# Patient Record
Sex: FEMALE | Race: WHITE | NOT HISPANIC OR LATINO | Employment: OTHER | URBAN - METROPOLITAN AREA
[De-identification: names, ages, dates, MRNs, and addresses within clinical notes are randomized per-mention and may not be internally consistent; named-entity substitution may affect disease eponyms.]

---

## 2017-02-04 ENCOUNTER — APPOINTMENT (EMERGENCY)
Dept: CT IMAGING | Facility: HOSPITAL | Age: 82
End: 2017-02-04
Payer: COMMERCIAL

## 2017-02-04 ENCOUNTER — HOSPITAL ENCOUNTER (EMERGENCY)
Facility: HOSPITAL | Age: 82
Discharge: HOME | End: 2017-02-04
Attending: EMERGENCY MEDICINE
Payer: COMMERCIAL

## 2017-02-04 ENCOUNTER — APPOINTMENT (EMERGENCY)
Dept: RADIOLOGY | Facility: HOSPITAL | Age: 82
End: 2017-02-04
Payer: COMMERCIAL

## 2017-02-04 VITALS
OXYGEN SATURATION: 98 % | WEIGHT: 145 LBS | SYSTOLIC BLOOD PRESSURE: 140 MMHG | DIASTOLIC BLOOD PRESSURE: 70 MMHG | RESPIRATION RATE: 16 BRPM | HEART RATE: 70 BPM | TEMPERATURE: 97.8 F

## 2017-02-04 DIAGNOSIS — S00.31XA NASAL ABRASION, INITIAL ENCOUNTER: ICD-10-CM

## 2017-02-04 DIAGNOSIS — S80.02XA CONTUSION OF LEFT KNEE, INITIAL ENCOUNTER: ICD-10-CM

## 2017-02-04 DIAGNOSIS — S80.212A ABRASION, LEFT KNEE, INITIAL ENCOUNTER: ICD-10-CM
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
## 2017-02-04 DIAGNOSIS — S52.531A CLOSED COLLES' FRACTURE OF RIGHT RADIUS, INITIAL ENCOUNTER: ICD-10-CM

## 2017-02-04 DIAGNOSIS — S00.83XA FACIAL CONTUSION, INITIAL ENCOUNTER: ICD-10-CM

## 2017-02-04 LAB
ANION GAP BLD CALC-SCNC: 19 MMOL/L (ref 4–13)
BASOPHILS # BLD AUTO: 0.06 THOUSANDS/ΜL (ref 0–0.1)
BASOPHILS NFR BLD AUTO: 1 % (ref 0–1)
BUN BLD-MCNC: 14 MG/DL (ref 5–25)
CA-I BLD-SCNC: 1.1 MMOL/L (ref 1.12–1.32)
CHLORIDE BLD-SCNC: 101 MMOL/L (ref 100–108)
CREAT BLD-MCNC: 0.7 MG/DL (ref 0.6–1.3)
EOSINOPHIL # BLD AUTO: 0.17 THOUSAND/ΜL (ref 0–0.61)
EOSINOPHIL NFR BLD AUTO: 2 % (ref 0–6)
ERYTHROCYTE [DISTWIDTH] IN BLOOD BY AUTOMATED COUNT: 14 % (ref 11.6–15.1)
GFR SERPL CREATININE-BSD FRML MDRD: >60 ML/MIN/1.73SQ M
GLUCOSE SERPL-MCNC: 256 MG/DL (ref 65–140)
HCT VFR BLD AUTO: 41.7 % (ref 34.8–46.1)
HCT VFR BLD CALC: 41 % (ref 34.8–46.1)
HGB BLD-MCNC: 13.7 G/DL (ref 11.5–15.4)
HGB BLDA-MCNC: 13.9 G/DL (ref 11.5–15.4)
INR PPP: 1 (ref 0.86–1.16)
LYMPHOCYTES # BLD AUTO: 1.31 THOUSANDS/ΜL (ref 0.6–4.47)
LYMPHOCYTES NFR BLD AUTO: 16 % (ref 14–44)
MCH RBC QN AUTO: 29.1 PG (ref 26.8–34.3)
MCHC RBC AUTO-ENTMCNC: 32.9 G/DL (ref 31.4–37.4)
MCV RBC AUTO: 89 FL (ref 82–98)
MONOCYTES # BLD AUTO: 0.88 THOUSAND/ΜL (ref 0.17–1.22)
MONOCYTES NFR BLD AUTO: 11 % (ref 4–12)
NEUTROPHILS # BLD AUTO: 5.78 THOUSANDS/ΜL (ref 1.85–7.62)
NEUTS SEG NFR BLD AUTO: 70 % (ref 43–75)
PCO2 BLD: 24 MMOL/L (ref 21–32)
PLATELET # BLD AUTO: 302 THOUSANDS/UL (ref 149–390)
PMV BLD AUTO: 9.9 FL (ref 8.9–12.7)
POTASSIUM BLD-SCNC: 4.1 MMOL/L (ref 3.5–5.3)
PROTHROMBIN TIME: 13.1 SECONDS (ref 12–14.3)
RBC # BLD AUTO: 4.7 MILLION/UL (ref 3.81–5.12)
SODIUM BLD-SCNC: 139 MMOL/L (ref 136–145)
SPECIMEN SOURCE: ABNORMAL
WBC # BLD AUTO: 8.2 THOUSAND/UL (ref 4.31–10.16)

## 2017-02-04 PROCEDURE — 90471 IMMUNIZATION ADMIN: CPT

## 2017-02-04 PROCEDURE — 73564 X-RAY EXAM KNEE 4 OR MORE: CPT

## 2017-02-04 PROCEDURE — 85610 PROTHROMBIN TIME: CPT | Performed by: EMERGENCY MEDICINE

## 2017-02-04 PROCEDURE — 93005 ELECTROCARDIOGRAM TRACING: CPT | Performed by: EMERGENCY MEDICINE

## 2017-02-04 PROCEDURE — 90715 TDAP VACCINE 7 YRS/> IM: CPT | Performed by: EMERGENCY MEDICINE

## 2017-02-04 PROCEDURE — 80047 BASIC METABLC PNL IONIZED CA: CPT

## 2017-02-04 PROCEDURE — 85025 COMPLETE CBC W/AUTO DIFF WBC: CPT | Performed by: EMERGENCY MEDICINE

## 2017-02-04 PROCEDURE — 99285 EMERGENCY DEPT VISIT HI MDM: CPT

## 2017-02-04 PROCEDURE — 70450 CT HEAD/BRAIN W/O DYE: CPT

## 2017-02-04 PROCEDURE — 71260 CT THORAX DX C+: CPT

## 2017-02-04 PROCEDURE — 73110 X-RAY EXAM OF WRIST: CPT

## 2017-02-04 PROCEDURE — 74177 CT ABD & PELVIS W/CONTRAST: CPT

## 2017-02-04 PROCEDURE — 72125 CT NECK SPINE W/O DYE: CPT

## 2017-02-04 PROCEDURE — 36415 COLL VENOUS BLD VENIPUNCTURE: CPT | Performed by: EMERGENCY MEDICINE

## 2017-02-04 PROCEDURE — 96374 THER/PROPH/DIAG INJ IV PUSH: CPT

## 2017-02-04 RX ORDER — 0.9 % SODIUM CHLORIDE 0.9 %
3 VIAL (ML) INJECTION AS NEEDED
Status: DISCONTINUED | OUTPATIENT
Start: 2017-02-04 | End: 2017-02-05 | Stop reason: HOSPADM

## 2017-02-04 RX ORDER — SULFAMETHOXAZOLE AND TRIMETHOPRIM 400; 80 MG/1; MG/1
1 TABLET ORAL 2 TIMES DAILY
COMMUNITY
End: 2017-02-04 | Stop reason: CLARIF

## 2017-02-04 RX ORDER — PANTOPRAZOLE SODIUM 40 MG/1
40 TABLET, DELAYED RELEASE ORAL DAILY
COMMUNITY

## 2017-02-04 RX ORDER — HYDROCODONE BITARTRATE AND ACETAMINOPHEN 5; 325 MG/1; MG/1
0.5 TABLET ORAL EVERY 6 HOURS PRN
Qty: 6 TABLET | Refills: 0 | Status: SHIPPED | OUTPATIENT
Start: 2017-02-04 | End: 2017-02-07

## 2017-02-04 RX ORDER — KETOROLAC TROMETHAMINE 30 MG/ML
INJECTION, SOLUTION INTRAMUSCULAR; INTRAVENOUS
Status: COMPLETED
Start: 2017-02-04 | End: 2017-02-04

## 2017-02-04 RX ORDER — KETOROLAC TROMETHAMINE 30 MG/ML
15 INJECTION, SOLUTION INTRAMUSCULAR; INTRAVENOUS ONCE
Status: COMPLETED | OUTPATIENT
Start: 2017-02-04 | End: 2017-02-04

## 2017-02-04 RX ORDER — SULFAMETHOXAZOLE AND TRIMETHOPRIM 800; 160 MG/1; MG/1
1 TABLET ORAL 2 TIMES DAILY
COMMUNITY
End: 2017-05-31 | Stop reason: ALTCHOICE

## 2017-02-04 RX ADMIN — IOHEXOL 100 ML: 350 INJECTION, SOLUTION INTRAVENOUS at 21:04

## 2017-02-04 RX ADMIN — TETANUS TOXOID, REDUCED DIPHTHERIA TOXOID AND ACELLULAR PERTUSSIS VACCINE, ADSORBED 0.5 ML: 5; 2.5; 8; 8; 2.5 SUSPENSION INTRAMUSCULAR at 21:17

## 2017-02-04 RX ADMIN — KETOROLAC TROMETHAMINE 15 MG: 30 INJECTION, SOLUTION INTRAMUSCULAR at 21:43

## 2017-02-04 RX ADMIN — KETOROLAC TROMETHAMINE 15 MG: 30 INJECTION, SOLUTION INTRAMUSCULAR; INTRAVENOUS at 21:43

## 2017-02-06 ENCOUNTER — HOSPITAL ENCOUNTER (OUTPATIENT)
Dept: RADIOLOGY | Facility: CLINIC | Age: 82
Discharge: HOME | End: 2017-02-06
Payer: COMMERCIAL

## 2017-02-06 ENCOUNTER — ALLSCRIPTS OFFICE VISIT (OUTPATIENT)
Dept: OTHER | Facility: OTHER | Age: 82
End: 2017-02-06

## 2017-02-06 DIAGNOSIS — S52.531D CLOSED COLLES' FRACTURE OF RIGHT RADIUS WITH ROUTINE HEALING: ICD-10-CM

## 2017-02-06 DIAGNOSIS — S62.109A CLOSED FRACTURE OF CARPAL BONE: ICD-10-CM

## 2017-02-06 DIAGNOSIS — S52.539A CLOSED COLLES' FRACTURE: ICD-10-CM

## 2017-02-06 LAB
ATRIAL RATE: 77 BPM
P AXIS: 44 DEGREES
PR INTERVAL: 182 MS
QRS AXIS: 80 DEGREES
QRSD INTERVAL: 76 MS
QT INTERVAL: 420 MS
QTC INTERVAL: 475 MS
T WAVE AXIS: 87 DEGREES
VENTRICULAR RATE: 77 BPM

## 2017-02-06 PROCEDURE — 73110 X-RAY EXAM OF WRIST: CPT

## 2017-02-13 ENCOUNTER — HOSPITAL ENCOUNTER (OUTPATIENT)
Dept: RADIOLOGY | Facility: CLINIC | Age: 82
Discharge: HOME | End: 2017-02-13
Payer: COMMERCIAL

## 2017-02-13 ENCOUNTER — ALLSCRIPTS OFFICE VISIT (OUTPATIENT)
Dept: OTHER | Facility: OTHER | Age: 82
End: 2017-02-13

## 2017-02-13 DIAGNOSIS — S52.539A CLOSED COLLES' FRACTURE: ICD-10-CM

## 2017-02-13 PROCEDURE — 73110 X-RAY EXAM OF WRIST: CPT

## 2017-02-27 ENCOUNTER — ALLSCRIPTS OFFICE VISIT (OUTPATIENT)
Dept: OTHER | Facility: OTHER | Age: 82
End: 2017-02-27

## 2017-02-27 ENCOUNTER — HOSPITAL ENCOUNTER (OUTPATIENT)
Dept: RADIOLOGY | Facility: CLINIC | Age: 82
Discharge: HOME | End: 2017-02-27
Payer: COMMERCIAL

## 2017-02-27 DIAGNOSIS — S52.531D CLOSED COLLES' FRACTURE OF RIGHT RADIUS WITH ROUTINE HEALING: ICD-10-CM

## 2017-02-27 PROCEDURE — 73110 X-RAY EXAM OF WRIST: CPT

## 2017-03-06 ENCOUNTER — HOSPITAL ENCOUNTER (OUTPATIENT)
Dept: RADIOLOGY | Facility: CLINIC | Age: 82
Discharge: HOME | End: 2017-03-06
Payer: COMMERCIAL

## 2017-03-06 ENCOUNTER — ALLSCRIPTS OFFICE VISIT (OUTPATIENT)
Dept: OTHER | Facility: OTHER | Age: 82
End: 2017-03-06

## 2017-03-06 DIAGNOSIS — S52.531D CLOSED COLLES' FRACTURE OF RIGHT RADIUS WITH ROUTINE HEALING: ICD-10-CM

## 2017-03-06 PROCEDURE — 73110 X-RAY EXAM OF WRIST: CPT

## 2017-03-24 DIAGNOSIS — S52.531D CLOSED COLLES' FRACTURE OF RIGHT RADIUS WITH ROUTINE HEALING: ICD-10-CM

## 2017-03-27 ENCOUNTER — HOSPITAL ENCOUNTER (OUTPATIENT)
Dept: RADIOLOGY | Facility: CLINIC | Age: 82
Discharge: HOME | End: 2017-03-27
Payer: COMMERCIAL

## 2017-03-27 ENCOUNTER — ALLSCRIPTS OFFICE VISIT (OUTPATIENT)
Dept: OTHER | Facility: OTHER | Age: 82
End: 2017-03-27

## 2017-03-27 DIAGNOSIS — S52.531D CLOSED COLLES' FRACTURE OF RIGHT RADIUS WITH ROUTINE HEALING: ICD-10-CM

## 2017-03-27 PROCEDURE — 73110 X-RAY EXAM OF WRIST: CPT

## 2017-05-31 ENCOUNTER — APPOINTMENT (EMERGENCY)
Dept: RADIOLOGY | Facility: HOSPITAL | Age: 82
End: 2017-05-31
Payer: COMMERCIAL

## 2017-05-31 ENCOUNTER — HOSPITAL ENCOUNTER (EMERGENCY)
Facility: HOSPITAL | Age: 82
Discharge: HOME | End: 2017-05-31
Attending: EMERGENCY MEDICINE
Payer: COMMERCIAL

## 2017-05-31 VITALS
SYSTOLIC BLOOD PRESSURE: 143 MMHG | DIASTOLIC BLOOD PRESSURE: 61 MMHG | RESPIRATION RATE: 20 BRPM | WEIGHT: 136.02 LBS | HEART RATE: 74 BPM | OXYGEN SATURATION: 100 % | TEMPERATURE: 98.2 F

## 2017-05-31 DIAGNOSIS — S62.102A: Primary | ICD-10-CM

## 2017-05-31 PROCEDURE — 73110 X-RAY EXAM OF WRIST: CPT

## 2017-05-31 PROCEDURE — 99283 EMERGENCY DEPT VISIT LOW MDM: CPT

## 2017-06-02 ENCOUNTER — ALLSCRIPTS OFFICE VISIT (OUTPATIENT)
Dept: OTHER | Facility: OTHER | Age: 82
End: 2017-06-02

## 2017-06-05 ENCOUNTER — APPOINTMENT (EMERGENCY)
Dept: RADIOLOGY | Facility: HOSPITAL | Age: 82
End: 2017-06-05
Payer: COMMERCIAL

## 2017-06-05 ENCOUNTER — HOSPITAL ENCOUNTER (EMERGENCY)
Facility: HOSPITAL | Age: 82
Discharge: NURSING, REHAB OR LONG-TERM CARE | End: 2017-06-06
Attending: EMERGENCY MEDICINE | Admitting: EMERGENCY MEDICINE
Payer: COMMERCIAL

## 2017-06-05 DIAGNOSIS — Z87.81 HISTORY OF FRACTURED KNEECAP: ICD-10-CM

## 2017-06-05 DIAGNOSIS — S52.022A CLOSED OLECRANON FRACTURE, LEFT, INITIAL ENCOUNTER: Primary | ICD-10-CM

## 2017-06-05 PROCEDURE — 73080 X-RAY EXAM OF ELBOW: CPT

## 2017-06-05 PROCEDURE — 73564 X-RAY EXAM KNEE 4 OR MORE: CPT

## 2017-06-05 PROCEDURE — 93005 ELECTROCARDIOGRAM TRACING: CPT

## 2017-06-05 RX ORDER — CITALOPRAM 20 MG/1
10 TABLET ORAL DAILY
COMMUNITY

## 2017-06-05 RX ORDER — TRAMADOL HYDROCHLORIDE 50 MG/1
50 TABLET ORAL EVERY 6 HOURS PRN
Qty: 30 TABLET | Refills: 0 | Status: SHIPPED | OUTPATIENT
Start: 2017-06-05 | End: 2017-06-15

## 2017-06-05 RX ORDER — ACETAMINOPHEN 325 MG/1
650 TABLET ORAL ONCE
Status: COMPLETED | OUTPATIENT
Start: 2017-06-05 | End: 2017-06-05

## 2017-06-05 RX ORDER — FLUDROCORTISONE ACETATE 0.1 MG/1
0.1 TABLET ORAL DAILY
Status: ON HOLD | COMMUNITY
End: 2019-08-22

## 2017-06-05 RX ADMIN — ACETAMINOPHEN 650 MG: 325 TABLET ORAL at 19:40

## 2017-06-06 VITALS
TEMPERATURE: 97.8 F | WEIGHT: 135 LBS | RESPIRATION RATE: 18 BRPM | DIASTOLIC BLOOD PRESSURE: 67 MMHG | HEIGHT: 61 IN | SYSTOLIC BLOOD PRESSURE: 164 MMHG | BODY MASS INDEX: 25.49 KG/M2 | OXYGEN SATURATION: 96 % | HEART RATE: 62 BPM

## 2017-06-06 LAB
ATRIAL RATE: 69 BPM
P AXIS: 13 DEGREES
PR INTERVAL: 166 MS
QRS AXIS: 68 DEGREES
QRSD INTERVAL: 72 MS
QT INTERVAL: 416 MS
QTC INTERVAL: 445 MS
T WAVE AXIS: 69 DEGREES
VENTRICULAR RATE: 69 BPM

## 2017-06-06 PROCEDURE — 99284 EMERGENCY DEPT VISIT MOD MDM: CPT

## 2017-06-08 ENCOUNTER — GENERIC CONVERSION - ENCOUNTER (OUTPATIENT)
Dept: OTHER | Facility: OTHER | Age: 82
End: 2017-06-08

## 2017-07-13 ENCOUNTER — TRANSCRIBE ORDERS (OUTPATIENT)
Dept: ADMINISTRATIVE | Facility: HOSPITAL | Age: 82
End: 2017-07-13

## 2017-07-13 DIAGNOSIS — R26.0 STAGGERING GAIT: Primary | ICD-10-CM

## 2017-07-19 DIAGNOSIS — S52.539A CLOSED COLLES' FRACTURE: ICD-10-CM

## 2017-07-19 DIAGNOSIS — S52.022A: ICD-10-CM

## 2017-07-19 DIAGNOSIS — S52.531D CLOSED COLLES' FRACTURE OF RIGHT RADIUS WITH ROUTINE HEALING: ICD-10-CM

## 2017-07-20 ENCOUNTER — ALLSCRIPTS OFFICE VISIT (OUTPATIENT)
Dept: OTHER | Facility: OTHER | Age: 82
End: 2017-07-20

## 2017-07-20 ENCOUNTER — APPOINTMENT (OUTPATIENT)
Dept: RADIOLOGY | Facility: CLINIC | Age: 82
End: 2017-07-20
Payer: COMMERCIAL

## 2017-07-20 ENCOUNTER — APPOINTMENT (OUTPATIENT)
Dept: OCCUPATIONAL THERAPY | Facility: CLINIC | Age: 82
End: 2017-07-20
Payer: COMMERCIAL

## 2017-07-20 DIAGNOSIS — S52.539A CLOSED COLLES' FRACTURE: ICD-10-CM

## 2017-07-20 DIAGNOSIS — S52.022A: ICD-10-CM

## 2017-07-20 DIAGNOSIS — S52.531D CLOSED COLLES' FRACTURE OF RIGHT RADIUS WITH ROUTINE HEALING: ICD-10-CM

## 2017-07-20 PROCEDURE — 73110 X-RAY EXAM OF WRIST: CPT

## 2017-07-20 PROCEDURE — 73080 X-RAY EXAM OF ELBOW: CPT

## 2017-07-20 PROCEDURE — 97760 ORTHOTIC MGMT&TRAING 1ST ENC: CPT

## 2017-07-21 ENCOUNTER — HOSPITAL ENCOUNTER (OUTPATIENT)
Dept: MRI IMAGING | Facility: HOSPITAL | Age: 82
Discharge: HOME | End: 2017-07-21
Payer: COMMERCIAL

## 2017-07-21 DIAGNOSIS — R26.0 STAGGERING GAIT: ICD-10-CM

## 2017-07-21 PROCEDURE — 70553 MRI BRAIN STEM W/O & W/DYE: CPT

## 2017-07-21 PROCEDURE — A9585 GADOBUTROL INJECTION: HCPCS | Performed by: RADIOLOGY

## 2017-07-21 RX ADMIN — GADOBUTROL 5 ML: 604.72 INJECTION INTRAVENOUS at 12:18

## 2017-08-31 ENCOUNTER — ALLSCRIPTS OFFICE VISIT (OUTPATIENT)
Dept: OTHER | Facility: OTHER | Age: 82
End: 2017-08-31

## 2018-01-13 VITALS
HEIGHT: 56 IN | WEIGHT: 120 LBS | SYSTOLIC BLOOD PRESSURE: 111 MMHG | DIASTOLIC BLOOD PRESSURE: 72 MMHG | BODY MASS INDEX: 26.99 KG/M2 | HEART RATE: 74 BPM

## 2018-01-13 VITALS
WEIGHT: 120 LBS | HEIGHT: 56 IN | DIASTOLIC BLOOD PRESSURE: 72 MMHG | HEART RATE: 88 BPM | BODY MASS INDEX: 26.99 KG/M2 | SYSTOLIC BLOOD PRESSURE: 124 MMHG

## 2018-01-13 VITALS — HEART RATE: 68 BPM | SYSTOLIC BLOOD PRESSURE: 120 MMHG | DIASTOLIC BLOOD PRESSURE: 72 MMHG

## 2018-01-13 VITALS — DIASTOLIC BLOOD PRESSURE: 85 MMHG | SYSTOLIC BLOOD PRESSURE: 134 MMHG | HEART RATE: 75 BPM

## 2018-01-13 VITALS — SYSTOLIC BLOOD PRESSURE: 147 MMHG | HEART RATE: 79 BPM | DIASTOLIC BLOOD PRESSURE: 89 MMHG

## 2018-01-14 VITALS
BODY MASS INDEX: 26.99 KG/M2 | SYSTOLIC BLOOD PRESSURE: 118 MMHG | HEIGHT: 56 IN | HEART RATE: 72 BPM | DIASTOLIC BLOOD PRESSURE: 70 MMHG | WEIGHT: 120 LBS

## 2018-01-15 VITALS — WEIGHT: 124 LBS | DIASTOLIC BLOOD PRESSURE: 66 MMHG | HEART RATE: 94 BPM | SYSTOLIC BLOOD PRESSURE: 128 MMHG

## 2018-01-22 VITALS
HEIGHT: 56 IN | DIASTOLIC BLOOD PRESSURE: 69 MMHG | BODY MASS INDEX: 26.99 KG/M2 | HEART RATE: 80 BPM | WEIGHT: 120 LBS | SYSTOLIC BLOOD PRESSURE: 113 MMHG

## 2019-08-22 ENCOUNTER — HOSPITAL ENCOUNTER (INPATIENT)
Facility: HOSPITAL | Age: 84
LOS: 2 days | Discharge: HOME HEALTH | DRG: 641 | End: 2019-08-24
Attending: EMERGENCY MEDICINE | Admitting: INTERNAL MEDICINE
Payer: COMMERCIAL

## 2019-08-22 ENCOUNTER — APPOINTMENT (EMERGENCY)
Dept: RADIOLOGY | Facility: HOSPITAL | Age: 84
DRG: 641 | End: 2019-08-22
Payer: COMMERCIAL

## 2019-08-22 DIAGNOSIS — R55 SYNCOPE: Primary | ICD-10-CM

## 2019-08-22 DIAGNOSIS — N39.0 URINARY TRACT INFECTION WITHOUT HEMATURIA, SITE UNSPECIFIED: ICD-10-CM

## 2019-08-22 DIAGNOSIS — G20 PARKINSON DISEASE (HCC): ICD-10-CM

## 2019-08-22 PROBLEM — K21.9 GERD (GASTROESOPHAGEAL REFLUX DISEASE): Chronic | Status: ACTIVE | Noted: 2019-08-22

## 2019-08-22 LAB
ALBUMIN SERPL BCP-MCNC: 3.3 G/DL (ref 3.5–5)
ALP SERPL-CCNC: 49 U/L (ref 46–116)
ALT SERPL W P-5'-P-CCNC: 9 U/L (ref 12–78)
ANION GAP SERPL CALCULATED.3IONS-SCNC: 8 MMOL/L (ref 4–13)
AST SERPL W P-5'-P-CCNC: 27 U/L (ref 5–45)
ATRIAL RATE: 67 BPM
BACTERIA UR QL AUTO: ABNORMAL /HPF
BASOPHILS # BLD AUTO: 0.08 THOUSANDS/ΜL (ref 0–0.1)
BASOPHILS NFR BLD AUTO: 1 % (ref 0–1)
BILIRUB SERPL-MCNC: 0.4 MG/DL (ref 0.2–1)
BILIRUB UR QL STRIP: NEGATIVE
BUN SERPL-MCNC: 19 MG/DL (ref 5–25)
CALCIUM SERPL-MCNC: 9.2 MG/DL (ref 8.3–10.1)
CHLORIDE SERPL-SCNC: 106 MMOL/L (ref 100–108)
CLARITY UR: ABNORMAL
CO2 SERPL-SCNC: 29 MMOL/L (ref 21–32)
COLOR UR: YELLOW
CREAT SERPL-MCNC: 0.73 MG/DL (ref 0.6–1.3)
EOSINOPHIL # BLD AUTO: 0.25 THOUSAND/ΜL (ref 0–0.61)
EOSINOPHIL NFR BLD AUTO: 3 % (ref 0–6)
ERYTHROCYTE [DISTWIDTH] IN BLOOD BY AUTOMATED COUNT: 13.3 % (ref 11.6–15.1)
GFR SERPL CREATININE-BSD FRML MDRD: 71 ML/MIN/1.73SQ M
GLUCOSE SERPL-MCNC: 169 MG/DL (ref 65–140)
GLUCOSE UR STRIP-MCNC: NEGATIVE MG/DL
HCT VFR BLD AUTO: 47 % (ref 34.8–46.1)
HGB BLD-MCNC: 14.6 G/DL (ref 11.5–15.4)
HGB UR QL STRIP.AUTO: NEGATIVE
IMM GRANULOCYTES # BLD AUTO: 0.08 THOUSAND/UL (ref 0–0.2)
IMM GRANULOCYTES NFR BLD AUTO: 1 % (ref 0–2)
KETONES UR STRIP-MCNC: NEGATIVE MG/DL
LEUKOCYTE ESTERASE UR QL STRIP: ABNORMAL
LYMPHOCYTES # BLD AUTO: 1.51 THOUSANDS/ΜL (ref 0.6–4.47)
LYMPHOCYTES NFR BLD AUTO: 16 % (ref 14–44)
MCH RBC QN AUTO: 29.3 PG (ref 26.8–34.3)
MCHC RBC AUTO-ENTMCNC: 31.1 G/DL (ref 31.4–37.4)
MCV RBC AUTO: 94 FL (ref 82–98)
MONOCYTES # BLD AUTO: 0.79 THOUSAND/ΜL (ref 0.17–1.22)
MONOCYTES NFR BLD AUTO: 8 % (ref 4–12)
NEUTROPHILS # BLD AUTO: 6.78 THOUSANDS/ΜL (ref 1.85–7.62)
NEUTS SEG NFR BLD AUTO: 71 % (ref 43–75)
NITRITE UR QL STRIP: POSITIVE
NON-SQ EPI CELLS URNS QL MICRO: ABNORMAL /HPF
NRBC BLD AUTO-RTO: 0 /100 WBCS
P AXIS: 35 DEGREES
PH UR STRIP.AUTO: 8 [PH]
PLATELET # BLD AUTO: 303 THOUSANDS/UL (ref 149–390)
PMV BLD AUTO: 9.8 FL (ref 8.9–12.7)
POTASSIUM SERPL-SCNC: 4.5 MMOL/L (ref 3.5–5.3)
PR INTERVAL: 180 MS
PROT SERPL-MCNC: 6.8 G/DL (ref 6.4–8.2)
PROT UR STRIP-MCNC: NEGATIVE MG/DL
QRS AXIS: 40 DEGREES
QRSD INTERVAL: 76 MS
QT INTERVAL: 426 MS
QTC INTERVAL: 450 MS
RBC # BLD AUTO: 4.99 MILLION/UL (ref 3.81–5.12)
RBC #/AREA URNS AUTO: ABNORMAL /HPF
SODIUM SERPL-SCNC: 143 MMOL/L (ref 136–145)
SP GR UR STRIP.AUTO: 1.01 (ref 1–1.03)
T WAVE AXIS: 57 DEGREES
TROPONIN I SERPL-MCNC: <0.02 NG/ML
UROBILINOGEN UR QL STRIP.AUTO: 0.2 E.U./DL
VENTRICULAR RATE: 67 BPM
WBC # BLD AUTO: 9.49 THOUSAND/UL (ref 4.31–10.16)
WBC #/AREA URNS AUTO: ABNORMAL /HPF

## 2019-08-22 PROCEDURE — 93010 ELECTROCARDIOGRAM REPORT: CPT | Performed by: INTERNAL MEDICINE

## 2019-08-22 PROCEDURE — 81001 URINALYSIS AUTO W/SCOPE: CPT | Performed by: EMERGENCY MEDICINE

## 2019-08-22 PROCEDURE — 71045 X-RAY EXAM CHEST 1 VIEW: CPT

## 2019-08-22 PROCEDURE — 87081 CULTURE SCREEN ONLY: CPT | Performed by: NURSE PRACTITIONER

## 2019-08-22 PROCEDURE — 87186 SC STD MICRODIL/AGAR DIL: CPT | Performed by: INTERNAL MEDICINE

## 2019-08-22 PROCEDURE — 99284 EMERGENCY DEPT VISIT MOD MDM: CPT | Performed by: EMERGENCY MEDICINE

## 2019-08-22 PROCEDURE — 36415 COLL VENOUS BLD VENIPUNCTURE: CPT | Performed by: EMERGENCY MEDICINE

## 2019-08-22 PROCEDURE — 85025 COMPLETE CBC W/AUTO DIFF WBC: CPT | Performed by: EMERGENCY MEDICINE

## 2019-08-22 PROCEDURE — 80053 COMPREHEN METABOLIC PANEL: CPT | Performed by: EMERGENCY MEDICINE

## 2019-08-22 PROCEDURE — 84484 ASSAY OF TROPONIN QUANT: CPT | Performed by: INTERNAL MEDICINE

## 2019-08-22 PROCEDURE — 87077 CULTURE AEROBIC IDENTIFY: CPT | Performed by: INTERNAL MEDICINE

## 2019-08-22 PROCEDURE — 99222 1ST HOSP IP/OBS MODERATE 55: CPT | Performed by: NURSE PRACTITIONER

## 2019-08-22 PROCEDURE — 93005 ELECTROCARDIOGRAM TRACING: CPT

## 2019-08-22 PROCEDURE — 96360 HYDRATION IV INFUSION INIT: CPT

## 2019-08-22 PROCEDURE — 99285 EMERGENCY DEPT VISIT HI MDM: CPT

## 2019-08-22 PROCEDURE — 87086 URINE CULTURE/COLONY COUNT: CPT | Performed by: INTERNAL MEDICINE

## 2019-08-22 RX ORDER — ACETAMINOPHEN 325 MG/1
650 TABLET ORAL EVERY 4 HOURS PRN
COMMUNITY

## 2019-08-22 RX ORDER — PANTOPRAZOLE SODIUM 40 MG/1
40 TABLET, DELAYED RELEASE ORAL
Status: DISCONTINUED | OUTPATIENT
Start: 2019-08-23 | End: 2019-08-24 | Stop reason: HOSPADM

## 2019-08-22 RX ORDER — POLYETHYLENE GLYCOL 3350 17 G/17G
17 POWDER, FOR SOLUTION ORAL DAILY PRN
COMMUNITY

## 2019-08-22 RX ORDER — POLYETHYLENE GLYCOL 3350 17 G/17G
17 POWDER, FOR SOLUTION ORAL DAILY PRN
Status: DISCONTINUED | OUTPATIENT
Start: 2019-08-22 | End: 2019-08-24 | Stop reason: HOSPADM

## 2019-08-22 RX ORDER — ACETAMINOPHEN 325 MG/1
650 TABLET ORAL EVERY 6 HOURS PRN
Status: DISCONTINUED | OUTPATIENT
Start: 2019-08-22 | End: 2019-08-24 | Stop reason: HOSPADM

## 2019-08-22 RX ORDER — DONEPEZIL HYDROCHLORIDE 10 MG/1
10 TABLET, FILM COATED ORAL
COMMUNITY

## 2019-08-22 RX ORDER — LANOLIN ALCOHOL/MO/W.PET/CERES
3 CREAM (GRAM) TOPICAL
COMMUNITY

## 2019-08-22 RX ORDER — MIDODRINE HYDROCHLORIDE 2.5 MG/1
2.5 TABLET ORAL DAILY PRN
COMMUNITY

## 2019-08-22 RX ORDER — ASPIRIN 81 MG/1
81 TABLET ORAL DAILY
COMMUNITY

## 2019-08-22 RX ORDER — DOCUSATE SODIUM 100 MG/1
100 CAPSULE, LIQUID FILLED ORAL 2 TIMES DAILY PRN
COMMUNITY

## 2019-08-22 RX ORDER — FUROSEMIDE 20 MG/1
20 TABLET ORAL DAILY
Status: DISCONTINUED | OUTPATIENT
Start: 2019-08-23 | End: 2019-08-24 | Stop reason: HOSPADM

## 2019-08-22 RX ORDER — SENNOSIDES 8.6 MG
1 TABLET ORAL DAILY
Status: DISCONTINUED | OUTPATIENT
Start: 2019-08-23 | End: 2019-08-24 | Stop reason: HOSPADM

## 2019-08-22 RX ORDER — DOCUSATE SODIUM 100 MG/1
100 CAPSULE, LIQUID FILLED ORAL DAILY
COMMUNITY

## 2019-08-22 RX ORDER — ACETAMINOPHEN 325 MG/1
650 TABLET ORAL 2 TIMES DAILY
COMMUNITY

## 2019-08-22 RX ORDER — CITALOPRAM 10 MG/1
10 TABLET ORAL DAILY
Status: DISCONTINUED | OUTPATIENT
Start: 2019-08-23 | End: 2019-08-24 | Stop reason: HOSPADM

## 2019-08-22 RX ORDER — LANOLIN ALCOHOL/MO/W.PET/CERES
6 CREAM (GRAM) TOPICAL
Status: DISCONTINUED | OUTPATIENT
Start: 2019-08-22 | End: 2019-08-24 | Stop reason: HOSPADM

## 2019-08-22 RX ORDER — ONDANSETRON 2 MG/ML
4 INJECTION INTRAMUSCULAR; INTRAVENOUS EVERY 6 HOURS PRN
Status: DISCONTINUED | OUTPATIENT
Start: 2019-08-22 | End: 2019-08-24 | Stop reason: HOSPADM

## 2019-08-22 RX ORDER — ASPIRIN 81 MG/1
81 TABLET ORAL DAILY
Status: DISCONTINUED | OUTPATIENT
Start: 2019-08-23 | End: 2019-08-24 | Stop reason: HOSPADM

## 2019-08-22 RX ORDER — HEPARIN SODIUM 5000 [USP'U]/ML
5000 INJECTION, SOLUTION INTRAVENOUS; SUBCUTANEOUS EVERY 8 HOURS SCHEDULED
Status: DISCONTINUED | OUTPATIENT
Start: 2019-08-22 | End: 2019-08-24 | Stop reason: HOSPADM

## 2019-08-22 RX ORDER — SODIUM CHLORIDE, SODIUM GLUCONATE, SODIUM ACETATE, POTASSIUM CHLORIDE, MAGNESIUM CHLORIDE, SODIUM PHOSPHATE, DIBASIC, AND POTASSIUM PHOSPHATE .53; .5; .37; .037; .03; .012; .00082 G/100ML; G/100ML; G/100ML; G/100ML; G/100ML; G/100ML; G/100ML
50 INJECTION, SOLUTION INTRAVENOUS CONTINUOUS
Status: DISCONTINUED | OUTPATIENT
Start: 2019-08-22 | End: 2019-08-23

## 2019-08-22 RX ORDER — MIDODRINE HYDROCHLORIDE 5 MG/1
2.5 TABLET ORAL DAILY PRN
Status: DISCONTINUED | OUTPATIENT
Start: 2019-08-22 | End: 2019-08-24 | Stop reason: HOSPADM

## 2019-08-22 RX ORDER — DOCUSATE SODIUM 100 MG/1
100 CAPSULE, LIQUID FILLED ORAL 2 TIMES DAILY
Status: DISCONTINUED | OUTPATIENT
Start: 2019-08-23 | End: 2019-08-24 | Stop reason: HOSPADM

## 2019-08-22 RX ORDER — FUROSEMIDE 20 MG/1
20 TABLET ORAL DAILY
COMMUNITY

## 2019-08-22 RX ORDER — DONEPEZIL HYDROCHLORIDE 5 MG/1
10 TABLET, FILM COATED ORAL
Status: DISCONTINUED | OUTPATIENT
Start: 2019-08-22 | End: 2019-08-24 | Stop reason: HOSPADM

## 2019-08-22 RX ADMIN — HEPARIN SODIUM 5000 UNITS: 5000 INJECTION, SOLUTION INTRAVENOUS; SUBCUTANEOUS at 23:21

## 2019-08-22 RX ADMIN — MELATONIN 6 MG: at 23:21

## 2019-08-22 RX ADMIN — SODIUM CHLORIDE 1000 ML: 0.9 INJECTION, SOLUTION INTRAVENOUS at 18:52

## 2019-08-22 RX ADMIN — CARBIDOPA AND LEVODOPA 1 TABLET: 25; 100 TABLET ORAL at 23:21

## 2019-08-22 RX ADMIN — DONEPEZIL HYDROCHLORIDE 10 MG: 5 TABLET ORAL at 23:21

## 2019-08-22 RX ADMIN — SODIUM CHLORIDE, SODIUM GLUCONATE, SODIUM ACETATE, POTASSIUM CHLORIDE, MAGNESIUM CHLORIDE, SODIUM PHOSPHATE, DIBASIC, AND POTASSIUM PHOSPHATE 50 ML/HR: .53; .5; .37; .037; .03; .012; .00082 INJECTION, SOLUTION INTRAVENOUS at 23:13

## 2019-08-22 NOTE — ED PROVIDER NOTES
History  Chief Complaint   Patient presents with    Syncope     patient had an assited fall, family reports 3 mins of unresponsiveness      [facility] with syncopal event assisted to ground, lasted about 3 minutes  Per niece, She has been grouchy over last few days  She has had low BP in past but not passing out  She had been sitting on patio for a while then went to get up and passed out  SBP was 70 per staff  She has dementia and does not recall feeling ill or events today  Prior to Admission Medications   Prescriptions Last Dose Informant Patient Reported?  Taking?   acetaminophen (TYLENOL) 325 mg tablet  Outside Facility (Specify) Yes Yes   Sig: Take 650 mg by mouth every 4 (four) hours as needed for mild pain or fever   acetaminophen (TYLENOL) 325 mg tablet  Outside Facility (Specify) Yes Yes   Sig: Take 650 mg by mouth 2 (two) times a day   aspirin (ECOTRIN LOW STRENGTH) 81 mg EC tablet  Outside Facility (Specify) Yes Yes   Sig: Take 81 mg by mouth daily   carbidopa-levodopa (SINEMET)  mg per tablet  Outside Facility (Specify) Yes Yes   Sig: Take 1 tablet by mouth 4 (four) times a day   citalopram (CeleXA) 20 mg tablet  Outside Facility (Specify) Yes Yes   Sig: Take 10 mg by mouth daily    docusate sodium (COLACE) 100 mg capsule  Outside Facility (Specify) Yes Yes   Sig: Take 100 mg by mouth 2 (two) times a day as needed for constipation   docusate sodium (COLACE) 100 mg capsule  Outside Facility (Specify) Yes Yes   Sig: Take 100 mg by mouth daily   donepezil (ARICEPT) 10 mg tablet   Yes Yes   Sig: Take 10 mg by mouth daily at bedtime   furosemide (LASIX) 20 mg tablet  Outside Facility (Specify) Yes Yes   Sig: Take 20 mg by mouth daily   melatonin 3 mg  Outside Facility (Specify) Yes Yes   Sig: Take 3 mg by mouth daily at bedtime   midodrine (PROAMATINE) 2 5 mg tablet  Outside Facility (Specify) Yes Yes   Sig: Take 2 5 mg by mouth daily as needed (for SBP<90)   pantoprazole (PROTONIX) 40 mg tablet   Yes Yes   Sig: Take 40 mg by mouth daily   polyethylene glycol (MIRALAX) 17 g packet  Outside Facility (Specify) Yes Yes   Sig: Take 17 g by mouth daily as needed      Facility-Administered Medications: None       Past Medical History:   Diagnosis Date    Dementia     Depression     GERD (gastroesophageal reflux disease)     Macular degeneration     Parkinson disease ([facility] [address] )     Vitamin B deficiency        History reviewed  No pertinent surgical history  History reviewed  No pertinent family history  I have reviewed and agree with the history as documented  Social History     Tobacco Use    Smoking status: Never Smoker   Substance Use Topics    Alcohol use: No    Drug use: No        Review of Systems   Unable to perform ROS: Dementia   All other systems reviewed and are negative  Physical Exam  Physical Exam   Constitutional: She appears well-developed and well-nourished  HENT:   Mouth/Throat: Oropharynx is clear and moist    Eyes: Pupils are equal, round, and reactive to light  Conjunctivae and EOM are normal    Neck: Normal range of motion  Neck supple  No spinous process tenderness present  Cardiovascular: Normal rate, regular rhythm, normal heart sounds and intact distal pulses  Pulmonary/Chest: Effort normal and breath sounds normal  No respiratory distress  She has no wheezes  Abdominal: Soft  Bowel sounds are normal  She exhibits no distension  There is no tenderness  Musculoskeletal: Normal range of motion  Neurological: She is alert  She has normal strength  She is disoriented  No sensory deficit  GCS eye subscore is 4  GCS verbal subscore is 5  GCS motor subscore is 6  Skin: Skin is warm and dry  No rash noted  Psychiatric: She has a normal mood and affect  Nursing note and vitals reviewed        Vital Signs  ED Triage Vitals [08/22/19 1550]   Temperature Pulse Respirations Blood Pressure SpO2   97 9 °F (36 6 °C) 65 18 144/66 100 %      Temp Source Heart Rate Source Patient Position - Orthostatic VS BP Location FiO2 (%)   Temporal Monitor Lying Left arm --      Pain Score       No Pain           Vitals:    08/23/19 0747 08/23/19 1559 08/23/19 2100 08/24/19 0100   BP: 132/63 137/63 107/59 115/58   Pulse: 64 69 76 67   Patient Position - Orthostatic VS: Lying Sitting Lying Lying         Visual Acuity  Visual Acuity      Most Recent Value   L Pupil Size (mm)  3   R Pupil Size (mm)  3   L Pupil Shape  Round   R Pupil Shape  Round          ED Medications  Medications   heparin (porcine) subcutaneous injection 5,000 Units (5,000 Units Subcutaneous Given 8/24/19 0518)   ondansetron (ZOFRAN) injection 4 mg (has no administration in time range)   docusate sodium (COLACE) capsule 100 mg (100 mg Oral Given 8/23/19 1804)   senna (SENOKOT) tablet 8 6 mg (8 6 mg Oral Given 8/23/19 0921)   pantoprazole (PROTONIX) EC tablet 40 mg (40 mg Oral Given 8/24/19 0519)   aspirin (ECOTRIN LOW STRENGTH) EC tablet 81 mg (81 mg Oral Given 8/23/19 0921)   carbidopa-levodopa (SINEMET)  mg per tablet 1 tablet (1 tablet Oral Given 8/23/19 2122)   citalopram (CeleXA) tablet 10 mg (10 mg Oral Given 8/23/19 0922)   donepezil (ARICEPT) tablet 10 mg (10 mg Oral Given 8/23/19 2122)   furosemide (LASIX) tablet 20 mg (20 mg Oral Given 8/23/19 0922)   melatonin tablet 6 mg (6 mg Oral Given 8/23/19 2122)   midodrine (PROAMATINE) tablet 2 5 mg (has no administration in time range)   polyethylene glycol (MIRALAX) packet 17 g (has no administration in time range)   acetaminophen (TYLENOL) tablet 650 mg (has no administration in time range)   cefuroxime (CEFTIN) tablet 250 mg (250 mg Oral Given 8/23/19 2122)   sodium chloride 0 9 % bolus 1,000 mL (0 mL Intravenous Stopped 8/22/19 1939)       Diagnostic Studies  Results Reviewed     Procedure Component Value Units Date/Time    Urine culture [[id number]] Collected:  08/22/19 1948    Lab Status:   In process Specimen:  Urine, Clean Catch Updated:  08/23/19 1213    Troponin I [[id number]]  (Normal) Collected:  08/23/19 0405    Lab Status:  Final result Specimen:  Blood from Arm, Right Updated:  08/23/19 0556     Troponin I 0 02 ng/mL     Troponin I [[id number]]  (Normal) Collected:  08/23/19 0117    Lab Status:  Final result Specimen:  Blood from Arm, Right Updated:  08/23/19 0237     Troponin I <0 02 ng/mL     Troponin I [[id number]]     Lab Status:  No result Specimen:  Blood     Urine Microscopic [[id number]]  (Abnormal) Collected:  08/22/19 1948    Lab Status:  Final result Specimen:  Urine, Straight Cath Updated:  08/22/19 2020     RBC, UA None Seen /hpf      WBC, UA 4-10 /hpf      Epithelial Cells Occasional /hpf      Bacteria, UA Moderate /hpf     UA w Reflex to Microscopic w Reflex to Culture [[id number]]  (Abnormal) Collected:  08/22/19 1948    Lab Status:  Final result Specimen:  Urine, Straight Cath Updated:  08/22/19 2001     Color, UA Yellow     Clarity, UA Slightly Cloudy     Specific [place], UA 1 010     pH, UA 8 0     Leukocytes, UA Small     Nitrite, UA Positive     Protein, UA Negative mg/dl      Glucose, UA Negative mg/dl      Ketones, UA Negative mg/dl      Urobilinogen, UA 0 2 E U /dl      Bilirubin, UA Negative     Blood, UA Negative    Comprehensive metabolic panel [[id number]]  (Abnormal) Collected:  08/22/19 1641    Lab Status:  Final result Specimen:  Blood from Arm, Right Updated:  08/22/19 1712     Sodium 143 mmol/L      Potassium 4 5 mmol/L      Chloride 106 mmol/L      CO2 29 mmol/L      ANION GAP 8 mmol/L      BUN 19 mg/dL      Creatinine 0 73 mg/dL      Glucose 169 mg/dL      Calcium 9 2 mg/dL      AST 27 U/L      ALT 9 U/L      Alkaline Phosphatase 49 U/L      Total Protein 6 8 g/dL      Albumin 3 3 g/dL      Total Bilirubin 0 40 mg/dL      eGFR 71 ml/min/1 73sq m     Narrative:       [place] guidelines for Chronic Kidney Disease (CKD):     Stage 1 with normal or high GFR (GFR > 90 mL/min/1 73 square meters)    Stage 2 Mild CKD (GFR = 60-89 mL/min/1 73 square meters)    Stage 3A Moderate CKD (GFR = 45-59 mL/min/1 73 square meters)    Stage 3B Moderate CKD (GFR = 30-44 mL/min/1 73 square meters)    Stage 4 Severe CKD (GFR = 15-29 mL/min/1 73 square meters)    Stage 5 End Stage CKD (GFR <15 mL/min/1 73 square meters)  Note: GFR calculation is accurate only with a steady state creatinine    CBC and differential [[id number]]  (Abnormal) Collected:  08/22/19 1641    Lab Status:  Final result Specimen:  Blood from Arm, Right Updated:  08/22/19 1651     WBC 9 49 Thousand/uL      RBC 4 99 Million/uL      Hemoglobin 14 6 g/dL      Hematocrit 47 0 %      MCV 94 fL      MCH 29 3 pg      MCHC 31 1 g/dL      RDW 13 3 %      MPV 9 8 fL      Platelets 028 Thousands/uL      nRBC 0 /100 WBCs      Neutrophils Relative 71 %      Immat GRANS % 1 %      Lymphocytes Relative 16 %      Monocytes Relative 8 %      Eosinophils Relative 3 %      Basophils Relative 1 %      Neutrophils Absolute 6 78 Thousands/µL      Immature Grans Absolute 0 08 Thousand/uL      Lymphocytes Absolute 1 51 Thousands/µL      Monocytes Absolute 0 79 Thousand/µL      Eosinophils Absolute 0 25 Thousand/µL      Basophils Absolute 0 08 Thousands/µL                  XR chest portable   ED Interpretation by Cornelius Bustillos DO (08/22 1732)   No acute abnl      Final Result by Fox Aguilar DO (08/22 1811)      No acute cardiopulmonary disease  Right middle lobe nodule described on the previous study is not discernible on this exam   Has the patient had outside follow-up CT imaging as previously recommended? If not, consider nonemergent CT chest to document two-year stability  The study was marked in [facility] for immediate notification        Workstation performed: [id number]                    Procedures  ECG 12 Lead Documentation Only  Date/Time: 8/22/2019 7:31 PM  Performed by: Cornelius Bustillos DO  Authorized by: Mik Haywood Marisol Cueto DO     Indications / Diagnosis:  Syncope  ECG reviewed by me, the ED Provider: yes    Patient location:  ED  Previous ECG:     Previous ECG:  Compared to current    Comparison ECG info:  2017    Similarity:  No change  Interpretation:     Interpretation: normal    Rate:     ECG rate:  67    ECG rate assessment: normal    Rhythm:     Rhythm: sinus rhythm    Ectopy:     Ectopy: none    QRS:     QRS axis:  Normal    QRS intervals:  Normal  Conduction:     Conduction: normal    ST segments:     ST segments:  Normal  T waves:     T waves: normal             ED Course                               MDM  Number of Diagnoses or Management Options  Syncope: new and requires workup     Amount and/or Complexity of Data Reviewed  Clinical lab tests: ordered and reviewed  Tests in the radiology section of CPT®: reviewed and ordered  Obtain history from someone other than the patient: yes  Discuss the patient with other providers: yes    Patient Progress  Patient progress: improved      Disposition  Final diagnoses:   Syncope     Time reflects when diagnosis was documented in both MDM as applicable and the Disposition within this note     Time User Action Codes Description Comment    8/22/2019  6:52 PM Charmayne Melville Add [R91 1] Lung nodule     8/22/2019  6:52 PM Charmayne Melville Add [R55] Syncope     8/22/2019  7:29 PM Charmayne Melville Modify [R55] Syncope     8/22/2019  7:29 PM Charmayne Melville Remove [R91 1] Lung nodule     8/22/2019  8:51 PM Kiley Jacome Modify [R55] Syncope       ED Disposition     ED Disposition Condition Date/Time Comment    Admit Stable Thu Aug 22, 2019  7:29 PM Case was discussed with Shiela Springer* and the patient's admission status was agreed to be Admission Status: inpatient status to the service of Dr Venkata Singh*           Follow-up Information     Follow up With Specialties Details Why [facility]  Follow up  [phone]  fax# [phone]          Current Discharge Medication List      CONTINUE these medications which have NOT CHANGED    Details   !! acetaminophen (TYLENOL) 325 mg tablet Take 650 mg by mouth every 4 (four) hours as needed for mild pain or fever      !! acetaminophen (TYLENOL) 325 mg tablet Take 650 mg by mouth 2 (two) times a day      aspirin (ECOTRIN LOW STRENGTH) 81 mg EC tablet Take 81 mg by mouth daily      carbidopa-levodopa (SINEMET)  mg per tablet Take 1 tablet by mouth 4 (four) times a day      citalopram (CeleXA) 20 mg tablet Take 10 mg by mouth daily       !! docusate sodium (COLACE) 100 mg capsule Take 100 mg by mouth 2 (two) times a day as needed for constipation      !! docusate sodium (COLACE) 100 mg capsule Take 100 mg by mouth daily      donepezil (ARICEPT) 10 mg tablet Take 10 mg by mouth daily at bedtime      furosemide (LASIX) 20 mg tablet Take 20 mg by mouth daily      melatonin 3 mg Take 3 mg by mouth daily at bedtime      midodrine (PROAMATINE) 2 5 mg tablet Take 2 5 mg by mouth daily as needed (for SBP<90)      pantoprazole (PROTONIX) 40 mg tablet Take 40 mg by mouth daily      polyethylene glycol (MIRALAX) 17 g packet Take 17 g by mouth daily as needed       !! - Potential duplicate medications found  Please discuss with provider  No discharge procedures on file      ED Provider  Electronically Signed by           Az Sykes DO  08/24/19 8532

## 2019-08-23 ENCOUNTER — APPOINTMENT (INPATIENT)
Dept: NON INVASIVE DIAGNOSTICS | Facility: HOSPITAL | Age: 84
DRG: 641 | End: 2019-08-23
Payer: COMMERCIAL

## 2019-08-23 PROBLEM — G20 PARKINSON DISEASE (HCC): Status: ACTIVE | Noted: 2019-08-23

## 2019-08-23 PROBLEM — N39.0 UTI (URINARY TRACT INFECTION): Status: ACTIVE | Noted: 2019-08-23

## 2019-08-23 PROBLEM — F03.90 DEMENTIA (HCC): Status: ACTIVE | Noted: 2019-08-23

## 2019-08-23 LAB
ANION GAP SERPL CALCULATED.3IONS-SCNC: 10 MMOL/L (ref 4–13)
BASOPHILS # BLD AUTO: 0.05 THOUSANDS/ΜL (ref 0–0.1)
BASOPHILS NFR BLD AUTO: 1 % (ref 0–1)
BUN SERPL-MCNC: 16 MG/DL (ref 5–25)
CALCIUM SERPL-MCNC: 8.6 MG/DL (ref 8.3–10.1)
CHLORIDE SERPL-SCNC: 108 MMOL/L (ref 100–108)
CO2 SERPL-SCNC: 26 MMOL/L (ref 21–32)
CREAT SERPL-MCNC: 0.55 MG/DL (ref 0.6–1.3)
EOSINOPHIL # BLD AUTO: 0.22 THOUSAND/ΜL (ref 0–0.61)
EOSINOPHIL NFR BLD AUTO: 3 % (ref 0–6)
ERYTHROCYTE [DISTWIDTH] IN BLOOD BY AUTOMATED COUNT: 13.4 % (ref 11.6–15.1)
GFR SERPL CREATININE-BSD FRML MDRD: 81 ML/MIN/1.73SQ M
GLUCOSE SERPL-MCNC: 119 MG/DL (ref 65–140)
HCT VFR BLD AUTO: 41.4 % (ref 34.8–46.1)
HGB BLD-MCNC: 13 G/DL (ref 11.5–15.4)
IMM GRANULOCYTES # BLD AUTO: 0.02 THOUSAND/UL (ref 0–0.2)
IMM GRANULOCYTES NFR BLD AUTO: 0 % (ref 0–2)
LYMPHOCYTES # BLD AUTO: 1.86 THOUSANDS/ΜL (ref 0.6–4.47)
LYMPHOCYTES NFR BLD AUTO: 25 % (ref 14–44)
MAGNESIUM SERPL-MCNC: 2 MG/DL (ref 1.6–2.6)
MCH RBC QN AUTO: 29 PG (ref 26.8–34.3)
MCHC RBC AUTO-ENTMCNC: 31.4 G/DL (ref 31.4–37.4)
MCV RBC AUTO: 92 FL (ref 82–98)
MONOCYTES # BLD AUTO: 0.7 THOUSAND/ΜL (ref 0.17–1.22)
MONOCYTES NFR BLD AUTO: 10 % (ref 4–12)
NEUTROPHILS # BLD AUTO: 4.46 THOUSANDS/ΜL (ref 1.85–7.62)
NEUTS SEG NFR BLD AUTO: 61 % (ref 43–75)
NRBC BLD AUTO-RTO: 0 /100 WBCS
PLATELET # BLD AUTO: 303 THOUSANDS/UL (ref 149–390)
PMV BLD AUTO: 10.6 FL (ref 8.9–12.7)
POTASSIUM SERPL-SCNC: 3.6 MMOL/L (ref 3.5–5.3)
RBC # BLD AUTO: 4.48 MILLION/UL (ref 3.81–5.12)
SODIUM SERPL-SCNC: 144 MMOL/L (ref 136–145)
TROPONIN I SERPL-MCNC: 0.02 NG/ML
TROPONIN I SERPL-MCNC: <0.02 NG/ML
WBC # BLD AUTO: 7.31 THOUSAND/UL (ref 4.31–10.16)

## 2019-08-23 PROCEDURE — 93306 TTE W/DOPPLER COMPLETE: CPT

## 2019-08-23 PROCEDURE — 84484 ASSAY OF TROPONIN QUANT: CPT | Performed by: NURSE PRACTITIONER

## 2019-08-23 PROCEDURE — 93306 TTE W/DOPPLER COMPLETE: CPT | Performed by: INTERNAL MEDICINE

## 2019-08-23 PROCEDURE — G8988 SELF CARE GOAL STATUS: HCPCS

## 2019-08-23 PROCEDURE — 97166 OT EVAL MOD COMPLEX 45 MIN: CPT

## 2019-08-23 PROCEDURE — 97163 PT EVAL HIGH COMPLEX 45 MIN: CPT

## 2019-08-23 PROCEDURE — G8978 MOBILITY CURRENT STATUS: HCPCS

## 2019-08-23 PROCEDURE — 99222 1ST HOSP IP/OBS MODERATE 55: CPT | Performed by: INTERNAL MEDICINE

## 2019-08-23 PROCEDURE — G8979 MOBILITY GOAL STATUS: HCPCS

## 2019-08-23 PROCEDURE — 85025 COMPLETE CBC W/AUTO DIFF WBC: CPT | Performed by: NURSE PRACTITIONER

## 2019-08-23 PROCEDURE — G8987 SELF CARE CURRENT STATUS: HCPCS

## 2019-08-23 PROCEDURE — 83735 ASSAY OF MAGNESIUM: CPT | Performed by: NURSE PRACTITIONER

## 2019-08-23 PROCEDURE — 80048 BASIC METABOLIC PNL TOTAL CA: CPT | Performed by: NURSE PRACTITIONER

## 2019-08-23 PROCEDURE — 99232 SBSQ HOSP IP/OBS MODERATE 35: CPT | Performed by: INTERNAL MEDICINE

## 2019-08-23 RX ORDER — CEFUROXIME AXETIL 250 MG/1
250 TABLET ORAL EVERY 12 HOURS SCHEDULED
Status: DISCONTINUED | OUTPATIENT
Start: 2019-08-23 | End: 2019-08-24 | Stop reason: HOSPADM

## 2019-08-23 RX ADMIN — HEPARIN SODIUM 5000 UNITS: 5000 INJECTION, SOLUTION INTRAVENOUS; SUBCUTANEOUS at 21:22

## 2019-08-23 RX ADMIN — CITALOPRAM HYDROBROMIDE 10 MG: 10 TABLET ORAL at 09:22

## 2019-08-23 RX ADMIN — CARBIDOPA AND LEVODOPA 1 TABLET: 25; 100 TABLET ORAL at 21:22

## 2019-08-23 RX ADMIN — CARBIDOPA AND LEVODOPA 1 TABLET: 25; 100 TABLET ORAL at 09:22

## 2019-08-23 RX ADMIN — HEPARIN SODIUM 5000 UNITS: 5000 INJECTION, SOLUTION INTRAVENOUS; SUBCUTANEOUS at 15:10

## 2019-08-23 RX ADMIN — CEFUROXIME AXETIL 250 MG: 250 TABLET, FILM COATED ORAL at 21:22

## 2019-08-23 RX ADMIN — DONEPEZIL HYDROCHLORIDE 10 MG: 5 TABLET ORAL at 21:22

## 2019-08-23 RX ADMIN — DOCUSATE SODIUM 100 MG: 100 CAPSULE, LIQUID FILLED ORAL at 18:04

## 2019-08-23 RX ADMIN — DOCUSATE SODIUM 100 MG: 100 CAPSULE, LIQUID FILLED ORAL at 09:21

## 2019-08-23 RX ADMIN — CARBIDOPA AND LEVODOPA 1 TABLET: 25; 100 TABLET ORAL at 18:04

## 2019-08-23 RX ADMIN — CEFUROXIME AXETIL 250 MG: 250 TABLET, FILM COATED ORAL at 13:56

## 2019-08-23 RX ADMIN — PANTOPRAZOLE SODIUM 40 MG: 40 TABLET, DELAYED RELEASE ORAL at 05:49

## 2019-08-23 RX ADMIN — FUROSEMIDE 20 MG: 20 TABLET ORAL at 09:22

## 2019-08-23 RX ADMIN — MELATONIN 6 MG: at 21:22

## 2019-08-23 RX ADMIN — ASPIRIN 81 MG: 81 TABLET, COATED ORAL at 09:21

## 2019-08-23 RX ADMIN — CARBIDOPA AND LEVODOPA 1 TABLET: 25; 100 TABLET ORAL at 13:56

## 2019-08-23 RX ADMIN — SENNOSIDES 8.6 MG: 8.6 TABLET, FILM COATED ORAL at 09:21

## 2019-08-23 RX ADMIN — HEPARIN SODIUM 5000 UNITS: 5000 INJECTION, SOLUTION INTRAVENOUS; SUBCUTANEOUS at 05:49

## 2019-08-23 NOTE — PROGRESS NOTES
Attempted to gain information regarding vaccine admin history from [facility]  Person answering phone stated nurse "not here" and was unsure if she "can even get that information " I requested that she try and call me back with the information; staff member agreed to do so

## 2019-08-23 NOTE — PROGRESS NOTES
Progress Note - Lolita Coleman 1/1/1926, 80 y o  female MRN: [id number]    Unit/Bed#: [address] [place], [place] [address] Encounter: [id number]    Primary Care Provider: Phani Leary DO   Date and time admitted to hospital: 8/22/2019  3:46 PM        UTI (urinary tract infection)  Assessment & Plan  Will start on Ceftin  Add urine cultures to urinalysis from yesterday    Parkinson disease ([address] )  Assessment & Plan  Continue Sinemet    Dementia  Assessment & Plan  No behavioral issues  Stable    GERD (gastroesophageal reflux disease)  Assessment & Plan  Protonix 40 mg daily    * Syncope  Assessment & Plan  Differential diagnosis including but not limited to: vasovagal syncope, cardiac arrhythmia, ACS, metabolic abnormality and dehydration  Neuro checks every 4 hours x 24 hours-Every 4 hours  Respiratory:  No acute issues, goal SpO2 greater than 94% -currently maintained on room air  · Cardiology consult  · Echocardiogram   Serial troponins are negative    GI:  Zofran p r n  Nausea vomiting,  :  Monitor I&Os, goal euvolemic  FEN:  Nursing dysphagia assessment, cardiac diet  Gentle IV hydration  Replete electrolytes with goals: K >4 0, Mag >2 0, and Phos >3 0  MSK:  Up with assistance only  At baseline patient uses walker  P T /OT        Labs & Imaging: I have personally reviewed pertinent reports  VTE Pharmacologic Prophylaxis: Heparin  VTE Mechanical Prophylaxis: sequential compression device    Code Status:   Level 3 - DNAR and DNI    Patient Centered Rounds: I have performed bedside rounds with nursing staff today  Discussions with Specialists or Other Care Team Provider:      Education and Discussions with Family / Patient:  Family    Current Length of Stay: 1 day(s)    Current Patient Status: Inpatient   Certification Statement: The patient will continue to require additional inpatient hospital stay due to see my assessment and plan  Subjective:   Patient is seen and examined at bedside  Presently demented    Denies any complaints  Objective:    Vitals: Blood pressure 132/63, pulse 64, temperature 97 6 °F (36 4 °C), temperature source Axillary, resp  rate 16, height 4' 8" (1 422 m), weight 56 6 kg (124 lb 12 5 oz), SpO2 94 %, not currently breastfeeding  ,Body mass index is 27 98 kg/m²  SPO2 RA Rest      ED to Hosp-Admission (Current) from 8/22/2019 in [facility]   SpO2  94 %   SpO2 Activity  At Rest   O2 Device  None (Room air)   O2 Flow Rate          I&O:     Intake/Output Summary (Last 24 hours) at 8/23/2019 1213  Last data filed at 8/23/2019 0600  Gross per 24 hour   Intake 1339 17 ml   Output 203 ml   Net 1136 17 ml       Physical Exam:    General- Alert, lying comfortably in bed  Not in any acute distress  HEENT- MONI, EOM intact  Neck- Supple, No JVD  CVS- regular, S1 and S2 normal   Chest- Bilateral Air entry, No rhochi, crackles or wheezing present  Abdomen- soft, nontender, not distended, no guarding or rigidity, BS+  Extremities-  No pedal edema, No calf tenderness  CNS-   has dementia  No focal deficits present  Moving all 4 extremities    Invasive Devices     Peripheral Intravenous Line            Peripheral IV 08/22/19 Left Arm less than 1 day                      Social History  reviewed  No family history on file   reviewed    Meds:  Current Facility-Administered Medications   Medication Dose Route Frequency Provider Last Rate Last Dose    acetaminophen (TYLENOL) tablet 650 mg  650 mg Oral Q6H PRN Palacios Maria G, CRNP        aspirin (ECOTRIN LOW STRENGTH) EC tablet 81 mg  81 mg Oral Daily Palacios Mchenry, CRNP   81 mg at 08/23/19 1039    carbidopa-levodopa (SINEMET)  mg per tablet 1 tablet  1 tablet Oral 4x Daily Palacios Mchenry, CRNP   1 tablet at 08/23/19 6772    cefuroxime (CEFTIN) tablet 250 mg  250 mg Oral Q12H Gatito Cealya MD        citalopram (CeleXA) tablet 10 mg  10 mg Oral Daily Palacios Maria G, CRNP   10 mg at 08/23/19 3117    docusate sodium (COLACE) capsule 100 mg  100 mg Oral BID Nelly Kariejerrell, JENNYNP   100 mg at 08/23/19 7940    donepezil (ARICEPT) tablet 10 mg  10 mg Oral HS Nelly Nation, CRNP   10 mg at 08/22/19 2321    furosemide (LASIX) tablet 20 mg  20 mg Oral Daily Nelly Nation, CRNP   20 mg at 08/23/19 8890    heparin (porcine) subcutaneous injection 5,000 Units  5,000 Units Subcutaneous [facility] Nelly Nation, CRNP   5,000 Units at 08/23/19 0549    melatonin tablet 6 mg  6 mg Oral HS Nelly Natino, CRNP   6 mg at 08/22/19 2321    midodrine (PROAMATINE) tablet 2 5 mg  2 5 mg Oral Daily PRN NellyANKITA Blair        multi-electrolyte (ISOLYTE-S PH 7 4 equivalent) IV solution  50 mL/hr Intravenous Continuous Nelly KariejerrellJENNYNP 50 mL/hr at 08/22/19 2313 50 mL/hr at 08/22/19 2313    ondansetron (ZOFRAN) injection 4 mg  4 mg Intravenous Q6H PRN Nelly Kariejerrell, CRNP        pantoprazole (PROTONIX) EC tablet 40 mg  40 mg Oral Early Morning Nelly Nation, CRNP   40 mg at 08/23/19 0549    polyethylene glycol (MIRALAX) packet 17 g  17 g Oral Daily PRN Nelly NationANKITA        senna (SENOKOT) tablet 8 6 mg  1 tablet Oral Daily Nelly Nation, JENNYNP   8 6 mg at 08/23/19 0376      Medications Prior to Admission   Medication    acetaminophen (TYLENOL) 325 mg tablet    acetaminophen (TYLENOL) 325 mg tablet    aspirin (ECOTRIN LOW STRENGTH) 81 mg EC tablet    carbidopa-levodopa (SINEMET)  mg per tablet    citalopram (CeleXA) 20 mg tablet    docusate sodium (COLACE) 100 mg capsule    docusate sodium (COLACE) 100 mg capsule    donepezil (ARICEPT) 10 mg tablet    furosemide (LASIX) 20 mg tablet    melatonin 3 mg    midodrine (PROAMATINE) 2 5 mg tablet    pantoprazole (PROTONIX) 40 mg tablet    polyethylene glycol (MIRALAX) 17 g packet       Labs:  Results from last 7 days   Lab Units 08/23/19  0405 08/22/19  1641   WBC Thousand/uL 7 31 9 49   HEMOGLOBIN g/dL 13 0 14 6   HEMATOCRIT % 41 4 47 0*   PLATELETS Thousands/uL 303 303   NEUTROS PCT % 61 71   LYMPHS PCT % 25 16   MONOS PCT % 10 8   EOS PCT % 3 3     Results from last 7 days   Lab Units 08/23/19  0405 08/22/19  1641   POTASSIUM mmol/L 3 6 4 5   CHLORIDE mmol/L 108 106   CO2 mmol/L 26 29   BUN mg/dL 16 19   CREATININE mg/dL 0 55* 0 73   CALCIUM mg/dL 8 6 9 2   ALK PHOS U/L  --  49   ALT U/L  --  9*   AST U/L  --  27     Lab Results   Component Value Date    TROPONINI 0 02 08/23/2019    TROPONINI <0 02 08/23/2019    TROPONINI <0 02 08/22/2019         No results found for: Seretha Juan, SPUTUMCULTUR      Imaging:  Results for orders placed during the hospital encounter of 08/22/19   XR chest portable    Narrative CHEST     INDICATION:  Irritable and syncope  COMPARISON:  CT chest, abdomen and pelvis 2/4/2017    EXAM PERFORMED/VIEWS:  XR CHEST PORTABLE    FINDINGS:  Monitoring leads and clips project over the chest     Cardiomediastinal silhouette appears unremarkable  The lungs are clear  Apparent elevation of the right diaphragm, unchanged  A right middle lobe nodule described on the previous study is not discernible on this exam      No pneumothorax or pleural effusion  S-shaped thoracolumbar scoliosis  Impression No acute cardiopulmonary disease  Right middle lobe nodule described on the previous study is not discernible on this exam   Has the patient had outside follow-up CT imaging as previously recommended? If not, consider nonemergent CT chest to document two-year stability  The study was marked in [facility] for immediate notification  Workstation performed: [id number]       No results found for this or any previous visit      Last 24 Hours Medication List:     Current Facility-Administered Medications:  acetaminophen 650 mg Oral Q6H PRN ANKITA Mtz    aspirin 81 mg Oral Daily NAKITA Mtz    carbidopa-levodopa 1 tablet Oral 4x Daily NaniANKITA Blackman    cefuroxime 250 mg Oral Q12H Justine De Luna MD    citalopram 10 mg Oral Daily Nani Feldman, CRNP    docusate sodium 100 mg Oral BID López Nicole, CRNP    donepezil 10 mg Oral HS López Nicole, CRNP    furosemide 20 mg Oral Daily López Nicole, CRNP    heparin (porcine) 5,000 Units Subcutaneous [facility] López Nicole, CRNP    melatonin 6 mg Oral HS Nani Feldman, CRNP    midodrine 2 5 mg Oral Daily PRN López Nicole, CRNP    multi-electrolyte 50 mL/hr Intravenous Continuous López Nicole, CRNP Last Rate: 50 mL/hr (08/22/19 0493)   ondansetron 4 mg Intravenous Q6H PRN López Nicole, CRNP    pantoprazole 40 mg Oral Early Morning Nani Feldman, CRNP    polyethylene glycol 17 g Oral Daily PRN López Nicole, CRNP    senna 1 tablet Oral Daily López Nicole, CRNP         Today, Patient Was Seen By: Aliya Rey MD    ** Please Note: Dictation voice to text software may have been used in the creation of this document   **

## 2019-08-23 NOTE — CONSULTS
Consultation - Cardiology   Ruel Menon 80 y o  female MRN: [id number]  Unit/Bed#: [address] [place] [address] [id number] Encounter: [id number]      Assessment:  Principal Problem:    Syncope  Active Problems:    GERD (gastroesophageal reflux disease)    Dementia    Parkinson disease ([facility] )    UTI (urinary tract infection)      Plan:    Etiology to patient's syncope was likely orthostatic in nature, possibly brought on by dehydration  There does not appear to be a cardiac etiology  This occurred as she was brought up from a seated position by staff  Cardiac workup is unremarkable  ECG and echocardiogram are normal   Telemetry not showing any arrhythmias  No further cardiac workup  Patient can be discharged from a cardiac perspective  History of Present Illness   Physician Requesting Consult: Devante Westfall MD  Reason for Consult / Principal Problem:  Syncope    HPI: Ruel Menon is a 80y o  year old female who presents with presents from a skilled nursing facility with an episode of syncope  Patient does not recall the events as she does have advanced dementia and is only oriented to person  According to her niece, they had got her up at seating position and then that moment she had lost consciousness and was out for approximately 3 minutes  Patient is not able to provide any remainder of history  She denies any cardiac history  She does not recall any of the events  Consults    Review of Systems:  Unobtainable secondary to advanced dementia  Historical Information   Past Medical History:   Diagnosis Date    Dementia     Depression     GERD (gastroesophageal reflux disease)     Macular degeneration     Parkinson disease ([place] [address] )     Vitamin B deficiency      No past surgical history on file    Social History     Substance and Sexual Activity   Alcohol Use No     Social History     Substance and Sexual Activity   Drug Use No     Social History     Tobacco Use   Smoking Status Never Smoker     Family History: non-contributory    Meds/Allergies   all current active meds have been reviewed  Allergies   Allergen Reactions    Bactrim [Sulfamethoxazole-Trimethoprim] Hives         Current Facility-Administered Medications:     acetaminophen (TYLENOL) tablet 650 mg, 650 mg, Oral, Q6H PRN, ANKITA Tamayo    aspirin (ECOTRIN LOW STRENGTH) EC tablet 81 mg, 81 mg, Oral, Daily, JENNY TamayoNP, 81 mg at 08/23/19 7497    carbidopa-levodopa (SINEMET)  mg per tablet 1 tablet, 1 tablet, Oral, 4x Daily, Nani Feldman, CRNP, 1 tablet at 08/23/19 1356    cefuroxime (CEFTIN) tablet 250 mg, 250 mg, Oral, Q12H [address], Eric Jones MD, 250 mg at 08/23/19 1356    citalopram (CeleXA) tablet 10 mg, 10 mg, Oral, Daily, Nani Feldman, CRNP, 10 mg at 08/23/19 5487    docusate sodium (COLACE) capsule 100 mg, 100 mg, Oral, BID, Nani Feldman, CRNP, 100 mg at 08/23/19 7901    donepezil (ARICEPT) tablet 10 mg, 10 mg, Oral, HS, Nani Feldman, CRNP, 10 mg at 08/22/19 2321    furosemide (LASIX) tablet 20 mg, 20 mg, Oral, Daily, Nani Feldman, CRNP, 20 mg at 08/23/19 8086    heparin (porcine) subcutaneous injection 5,000 Units, 5,000 Units, Subcutaneous, Q8H [address], Nani Feldman, CRNP, 5,000 Units at 08/23/19 1510    melatonin tablet 6 mg, 6 mg, Oral, HS, Nani Feldman, CRNP, 6 mg at 08/22/19 2321    midodrine (PROAMATINE) tablet 2 5 mg, 2 5 mg, Oral, Daily PRN, ANKITA Tamayo    ondansetron [facility]) injection 4 mg, 4 mg, Intravenous, Q6H PRN, ANKITA Tamayo    pantoprazole (PROTONIX) EC tablet 40 mg, 40 mg, Oral, Early Morning, Nani Feldman, CRNP, 40 mg at 08/23/19 0549    polyethylene glycol (MIRALAX) packet 17 g, 17 g, Oral, Daily PRN, ANKITA Tamayo    senna (SENOKOT) tablet 8 6 mg, 1 tablet, Oral, Daily, Nani Feldman, CRNP, 8 6 mg at 08/23/19 3108    Objective   Vitals: Blood pressure 137/63, pulse 69, temperature 98 5 °F (36 9 °C), temperature source Oral, resp   rate 18, height 4' 8" (1 422 m), weight 56 6 kg (124 lb 12 5 oz), SpO2 95 %, not currently breastfeeding , Body mass index is 27 98 kg/m² ,   Orthostatic Blood Pressures      Most Recent Value   Blood Pressure  137/63 filed at 08/23/2019 1559   Patient Position - Orthostatic VS  Sitting filed at 08/23/2019 1559            Intake/Output Summary (Last 24 hours) at 8/23/2019 1632  Last data filed at 8/23/2019 1451  Gross per 24 hour   Intake 1339 17 ml   Output 863 ml   Net 476 17 ml         Physical Exam:  GEN: Barry Mcdaniel appears well, alert and oriented x 3, pleasant and cooperative   HEENT: pupils equal, round, and reactive to light; extraocular muscles intact  NECK: supple, no carotid bruits   HEART: regular rhythm, normal S1 and S2, soft systolic murmur, clicks, gallops or rubs   LUNGS:  Diminished bilaterally; no wheezes, rales, or rhonchi   ABDOMEN: normal bowel sounds, soft, no tenderness, no distention  EXTREMITIES: peripheral pulses normal; no clubbing, cyanosis, or edema  NEURO: no focal findings   SKIN: normal without suspicious lesions on exposed skin    Lab Results:   Admission on 08/22/2019   Component Date Value    Ventricular Rate 08/22/2019 67     Atrial Rate 08/22/2019 67     NV Interval 08/22/2019 180     QRSD Interval 08/22/2019 76     QT Interval 08/22/2019 426     QTC Interval 08/22/2019 450     P Axis 08/22/2019 35     QRS Axis 08/22/2019 40     T Wave Axis 08/22/2019 57     WBC 08/22/2019 9 49     RBC 08/22/2019 4 99     Hemoglobin 08/22/2019 14 6     Hematocrit 08/22/2019 47 0*    MCV 08/22/2019 94     MCH 08/22/2019 29 3     MCHC 08/22/2019 31 1*    RDW 08/22/2019 13 3     MPV 08/22/2019 9 8     Platelets 21/68/5810 303     nRBC 08/22/2019 0     Neutrophils Relative 08/22/2019 71     Immat GRANS % 08/22/2019 1     Lymphocytes Relative 08/22/2019 16     Monocytes Relative 08/22/2019 8     Eosinophils Relative 08/22/2019 3     Basophils Relative 08/22/2019 1     Neutrophils Absolute 08/22/2019 6 78     Immature Grans Absolute 08/22/2019 0 08     Lymphocytes Absolute 08/22/2019 1 51     Monocytes Absolute 08/22/2019 0 79     Eosinophils Absolute 08/22/2019 0 25     Basophils Absolute 08/22/2019 0 08     Sodium 08/22/2019 143     Potassium 08/22/2019 4 5     Chloride 08/22/2019 106     CO2 08/22/2019 29     ANION GAP 08/22/2019 8     BUN 08/22/2019 19     Creatinine 08/22/2019 0 73     Glucose 08/22/2019 169*    Calcium 08/22/2019 9 2     AST 08/22/2019 27     ALT 08/22/2019 9*    Alkaline Phosphatase 08/22/2019 49     Total Protein 08/22/2019 6 8     Albumin 08/22/2019 3 3*    Total Bilirubin 08/22/2019 0 40     eGFR 08/22/2019 71     Color, UA 08/22/2019 Yellow     Clarity, UA 08/22/2019 Slightly Cloudy     Specific Gravity, UA 08/22/2019 1 010     pH, UA 08/22/2019 8 0     Leukocytes, UA 08/22/2019 Small*    Nitrite, UA 08/22/2019 Positive*    Protein, UA 08/22/2019 Negative     Glucose, UA 08/22/2019 Negative     Ketones, UA 08/22/2019 Negative     Urobilinogen, UA 08/22/2019 0 2     Bilirubin, UA 08/22/2019 Negative     Blood, UA 08/22/2019 Negative     RBC, UA 08/22/2019 None Seen     WBC, UA 08/22/2019 4-10*    Epithelial Cells 08/22/2019 Occasional     Bacteria, UA 08/22/2019 Moderate*    Troponin I 08/22/2019 <0 02     Troponin I 08/23/2019 <0 02     Troponin I 08/23/2019 0 02     Sodium 08/23/2019 144     Potassium 08/23/2019 3 6     Chloride 08/23/2019 108     CO2 08/23/2019 26     ANION GAP 08/23/2019 10     BUN 08/23/2019 16     Creatinine 08/23/2019 0 55*    Glucose 08/23/2019 119     Calcium 08/23/2019 8 6     eGFR 08/23/2019 81     Magnesium 08/23/2019 2 0     WBC 08/23/2019 7 31     RBC 08/23/2019 4 48     Hemoglobin 08/23/2019 13 0     Hematocrit 08/23/2019 41 4     MCV 08/23/2019 92     MCH 08/23/2019 29 0     MCHC 08/23/2019 31 4     RDW 08/23/2019 13 4     MPV 08/23/2019 10 6     Platelets 21/86/7032 303     nRBC 08/23/2019 0     Neutrophils Relative 08/23/2019 61     Immat GRANS % 08/23/2019 0     Lymphocytes Relative 08/23/2019 25     Monocytes Relative 08/23/2019 10     Eosinophils Relative 08/23/2019 3     Basophils Relative 08/23/2019 1     Neutrophils Absolute 08/23/2019 4 46     Immature Grans Absolute 08/23/2019 0 02     Lymphocytes Absolute 08/23/2019 1 86     Monocytes Absolute 08/23/2019 0 70     Eosinophils Absolute 08/23/2019 0 22     Basophils Absolute 08/23/2019 0 05      Labs & Results:    Results from last 7 days   Lab Units 08/23/19  0405 08/23/19  0117 08/22/19  2221   TROPONIN I ng/mL 0 02 <0 02 <0 02     Results from last 7 days   Lab Units 08/23/19  0405 08/22/19  1641   WBC Thousand/uL 7 31 9 49   HEMOGLOBIN g/dL 13 0 14 6   HEMATOCRIT % 41 4 47 0*   PLATELETS Thousands/uL 303 303         Results from last 7 days   Lab Units 08/23/19  0405 08/22/19  1641   POTASSIUM mmol/L 3 6 4 5   CHLORIDE mmol/L 108 106   CO2 mmol/L 26 29   BUN mg/dL 16 19   CREATININE mg/dL 0 55* 0 73   CALCIUM mg/dL 8 6 9 2   ALK PHOS U/L  --  49   ALT U/L  --  9*   AST U/L  --  27         Results from last 7 days   Lab Units 08/23/19  0405   MAGNESIUM mg/dL 2 0         Imaging: I have personally reviewed pertinent reports  Xr Chest Portable    Result Date: 8/22/2019  Narrative: CHEST INDICATION:  Irritable and syncope  COMPARISON:  CT chest, abdomen and pelvis 2/4/2017 EXAM PERFORMED/VIEWS:  XR CHEST PORTABLE FINDINGS:  Monitoring leads and clips project over the chest  Cardiomediastinal silhouette appears unremarkable  The lungs are clear  Apparent elevation of the right diaphragm, unchanged  A right middle lobe nodule described on the previous study is not discernible on this exam  No pneumothorax or pleural effusion  S-shaped thoracolumbar scoliosis  Impression: No acute cardiopulmonary disease   Right middle lobe nodule described on the previous study is not discernible on this exam   Has the patient had outside follow-up CT imaging as previously recommended? If not, consider nonemergent CT chest to document two-year stability  The study was marked in [facility] for immediate notification  Workstation performed: [id number]       EKG:  Normal sinus rhythm    ECHO:  LEFT VENTRICLE:  Systolic function was normal  Ejection fraction was estimated to be 65 %  Although no diagnostic regional wall motion abnormality was identified, this possibility cannot be completely excluded on the basis of this study  Doppler parameters were consistent with abnormal left ventricular relaxation (grade 1 diastolic dysfunction)      LEFT ATRIUM:  The atrium was mildly dilated      MITRAL VALVE:  There was mild annular calcification  There was mild regurgitation      TRICUSPID VALVE:  There was mild regurgitation  No significant arrhythmias seen on telemetry review  Counseling / Coordination of Care  Total floor / unit time spent today 40 minutes  Greater than 50% of total time was spent with the patient and / or family counseling and / or coordination of care

## 2019-08-23 NOTE — PLAN OF CARE
Problem: OCCUPATIONAL THERAPY ADULT  Goal: Performs self-care activities at highest level of function for planned discharge setting  See evaluation for individualized goals  Outcome: Progressing  Note:   Limitation: Decreased ADL status, Decreased endurance, Decreased self-care trans  Prognosis: Good  Assessment: Pt is a 80 y o  female seen for OT evaluation at [facility], admitted 8/22/2019 w/ Syncope  OT completed expanded review of pt's  medical and social history  Comorbidities affecting pt's functional performance at time of assessment include: dementia, parkinsons disease, UTI  Prior to admission, pt was [address] at [address] and was assisted with ADLs but able to use RW to walk to meals and transfer self  Upon evaluation, pt presents to OT below baseline due to the following performance deficits: weakness, decreased balance and decreased tolerance  Pt to benefit from continued skilled OT tx while in the hospital to address deficits as defined above and maximize level of functional independence w ADL's and functional mobility  Occupational Performance areas to address include: eating, grooming, toilet hygiene, functional mobility and functional transfers  Based on findings, pt is of moderate complexity  At this time, OT recommendations at time of discharge are return to [address] with 24hr assist as needed       OT Discharge Recommendation: 24 hour supervision/assist(Return to [facility] memory care unit)  OT - OK to Discharge: Yes(when medically stable)

## 2019-08-23 NOTE — PLAN OF CARE
Problem: PHYSICAL THERAPY ADULT  Goal: Performs mobility at highest level of function for planned discharge setting  See evaluation for individualized goals  Description  Treatment/Interventions: ADL retraining, Functional transfer training, Gait training, Patient/family training, Equipment eval/education  Equipment Recommended: Linard Bernheim       See flowsheet documentation for full assessment, interventions and recommendations  Outcome: Progressing  Note:   Prognosis: Good  Problem List: Impaired balance, Decreased mobility  Assessment: Pt able to mobilize form bed-commode - chair with rw and assistance  Family member states pt is near baseline level of function  will benefit form ksilled PT serivcse to improve safety with transfer and ambulation  Approraptie to return to [facility] at d/c with follow up home PT  Will follow see goals  Barriers to Discharge: (medical status)     Recommendation: Home PT(return to [facility] with home PT)          See flowsheet documentation for full assessment

## 2019-08-23 NOTE — ASSESSMENT & PLAN NOTE
Differential diagnosis including but not limited to: vasovagal syncope, cardiac arrhythmia, ACS, metabolic abnormality and dehydration    Neuro checks every 4 hours x 24 hours-Every 4 hours  Respiratory:  No acute issues, goal SpO2 greater than 94% -currently maintained on room air  Cardiology:  Telemetry monitoring  Obtain echocardiogram   Vitals per units  Orthostatics vital signs now  EKG in a m  and p r n  - currently normal sinus rhythm and blood pressure acceptable  Consider cardiology  consult  GI:  Zofran p r n  Nausea vomiting,  :  Monitor I&Os, goal euvolemic  FEN:  Nursing dysphagia assessment, cardiac diet  Gentle IV hydration  Replete electrolytes with goals: K >4 0, Mag >2 0, and Phos >3 0  MSK:  Up with assistance only  At baseline patient uses walker      Disposition:  Admit to medical-surgical unit with telemetry monitor

## 2019-08-23 NOTE — SOCIAL WORK
LOS: 1  GMLOS: 2 3  PATIENT IS NOT A BUNDLE OR A 30 DAY READMISSION  Met with patient and jalen Benavides at bedside  Patient is legally blind with a history of dementia  She is a resident of [facility]  She has a private room  She needs assistance with adls, ambulates with a walker, goes to DR for meals  As per Anastasia Benavides, the plan is for patient to return to [facility] at discharge  She would prefer she return there rather than go for skilled rehab at [facility]  She will transport patient home  Will follow

## 2019-08-23 NOTE — SOCIAL WORK
Continue to follow  Acknowledge recommendation for home PT   notified by Dr Joy Meredith of tentative discharge for 8/24  Met with Pt's niece(Roya: [phone]) at bedside  Pt's niece notified of home PT recommendation and in agreement  Freedom of Choice given  Pt's niece agreeable for [facility] for SN/PT  Referral sent to [facility] via [facility]  Call placed to [facility]; [phone]), spoke with Rosa Cali, informed of tentative discharge for 8/24 and PT recommendation for home PT  Rosa Cali informed by  of referral to [facility] for SN/ home PT and in agreement       Meena Salas: [phone]

## 2019-08-23 NOTE — ASSESSMENT & PLAN NOTE
Differential diagnosis including but not limited to: vasovagal syncope, cardiac arrhythmia, ACS, metabolic abnormality and dehydration  Neuro checks every 4 hours x 24 hours-Every 4 hours  Respiratory:  No acute issues, goal SpO2 greater than 94% -currently maintained on room air  · Cardiology consult  · Echocardiogram   Serial troponins are negative    GI:  Zofran p r n  Nausea vomiting,  :  Monitor I&Os, goal euvolemic  FEN:  Nursing dysphagia assessment, cardiac diet  Gentle IV hydration  Replete electrolytes with goals: K >4 0, Mag >2 0, and Phos >3 0  MSK:  Up with assistance only  At baseline patient uses walker    P T /OT

## 2019-08-23 NOTE — PLAN OF CARE
Problem: Prexisting or High Potential for Compromised Skin Integrity  Goal: Skin integrity is maintained or improved  Description  INTERVENTIONS:  - Identify patients at risk for skin breakdown  - Assess and monitor skin integrity  - Assess and monitor nutrition and hydration status  - Monitor labs   - Assess for incontinence   - Turn and reposition patient  - Assist with mobility/ambulation  - Relieve pressure over bony prominences  - Avoid friction and shearing  - Provide appropriate hygiene as needed including keeping skin clean and dry  - Evaluate need for skin moisturizer/barrier cream  - Collaborate with interdisciplinary team   - Patient/family teaching  - Consider wound care consult   Outcome: Progressing     Problem: PAIN - ADULT  Goal: Verbalizes/displays adequate comfort level or baseline comfort level  Description  Interventions:  - Encourage patient to monitor pain and request assistance  - Assess pain using appropriate pain scale  - Administer analgesics based on type and severity of pain and evaluate response  - Implement non-pharmacological measures as appropriate and evaluate response  - Consider cultural and social influences on pain and pain management  - Notify physician/advanced practitioner if interventions unsuccessful or patient reports new pain  Outcome: Progressing     Problem: INFECTION - ADULT  Goal: Absence or prevention of progression during hospitalization  Description  INTERVENTIONS:  - Assess and monitor for signs and symptoms of infection  - Monitor lab/diagnostic results  - [place] appropriate cooling/warming therapies per order  - Administer medications as ordered  - Instruct and encourage patient and family to use good hand hygiene technique  - Identify and instruct in appropriate isolation precautions for identified infection/condition   Outcome: Progressing     Problem: SAFETY ADULT  Goal: Patient will remain free of falls  Description  INTERVENTIONS:  - Assess patient frequently for physical needs  -  Identify cognitive and physical deficits and behaviors that affect risk of falls  -  [place] fall precautions as indicated by assessment   - Educate patient/family on patient safety including physical limitations  - Instruct patient to call for assistance with activity based on assessment  - Modify environment to reduce risk of injury  - Consider OT/PT consult to assist with strengthening/mobility  Outcome: Progressing     Problem: DISCHARGE PLANNING  Goal: Discharge to home or other facility with appropriate resources  Description  INTERVENTIONS:  - Identify barriers to discharge w/patient and caregiver  - Arrange for needed discharge resources and transportation as appropriate  - Identify discharge learning needs (meds, wound care, etc )  - Arrange for interpretive services to assist at discharge as needed  - Refer to Case Management Department for coordinating discharge planning if the patient needs post-hospital services based on physician/advanced practitioner order or complex needs related to functional status, cognitive ability, or social support system  Outcome: Progressing     Problem: Knowledge Deficit  Goal: Patient/family/caregiver demonstrates understanding of disease process, treatment plan, medications, and discharge instructions  Description  Complete learning assessment and assess knowledge base    Interventions:  - Provide teaching at level of understanding  - Provide teaching via preferred learning methods  Outcome: Progressing     Problem: SKIN/TISSUE INTEGRITY - ADULT  Goal: Skin integrity remains intact  Description  INTERVENTIONS  - Identify patients at risk for skin breakdown  - Assess and monitor skin integrity  - Assess and monitor nutrition and hydration status  - Monitor labs (i e  albumin)  - Assess for incontinence   - Turn and reposition patient  - Assist with mobility/ambulation  - Relieve pressure over bony prominences  - Avoid friction and shearing  - Provide appropriate hygiene as needed including keeping skin clean and dry  - Evaluate need for skin moisturizer/barrier cream  - Collaborate with interdisciplinary team (i e  Nutrition, Rehabilitation, etc )   - Patient/family teaching  Outcome: Progressing     Problem: HEMATOLOGIC - ADULT  Goal: Maintains hematologic stability  Description  INTERVENTIONS  - Assess for signs and symptoms of bleeding or hemorrhage  - Monitor labs  - Administer supportive blood products/factors as ordered and appropriate  Outcome: Progressing

## 2019-08-23 NOTE — H&P
H&P- Jaqueline Dolan 1/1/1926, 80 y o  female MRN: [id number]    Unit/Bed#: [facility] Encounter: [id number]    Primary Care Provider: Nati Leija DO   Date and time admitted to hospital: 8/22/2019  3:46 PM        * Syncope  Assessment & Plan  Differential diagnosis including but not limited to: vasovagal syncope, cardiac arrhythmia, ACS, metabolic abnormality and dehydration    Neuro checks every 4 hours x 24 hours-Every 4 hours  Respiratory:  No acute issues, goal SpO2 greater than 94% -currently maintained on room air  Cardiology:    · Telemetry monitoring  Vitals per units  · Obtain echocardiogram      · Orthostatics vital signs now  · Trend troponin  EKG in a m  and p r n  - currently normal sinus rhythm and blood pressure acceptable  · Consider cardiology  consult  GI:  Zofran p r n  Nausea vomiting,  :  Monitor I&Os, goal euvolemic  FEN:  Nursing dysphagia assessment, cardiac diet  Gentle IV hydration  Replete electrolytes with goals: K >4 0, Mag >2 0, and Phos >3 0  MSK:  Up with assistance only  At baseline patient uses walker  Disposition:  Admit to medical-surgical unit with telemetry monitor     VTE Prophylaxis: Heparin  / sequential compression device   Code Status: DNR/DNI  Discussion with family: The patient (and any family present) verbalized understanding of the plan of care  Specifically highlighted areas of special concern regarding plan of care and highlighted treatment plan  All questions were answered prior to leaving room  Anticipated Length of Stay:  Patient will be admitted on an Inpatient basis with an anticipated length of stay of  > 2 midnights  Justification for Hospital Stay:  Syncopal episode    Total Time for Visit, including Counseling / Coordination of Care: 30 minutes  Greater than 50% of this total time spent on direct patient counseling and coordination of care      Chief Complaint:  Syncope   History of Present Illness:    Jaqueline Dolan is a 80 y o  female with past medical history GERD, hard of hearing bilaterally and dementia who presented for evaluation of syncopal episode with loss of consciousness approximately 3 minutes  Patient is a resident of [address] [place] [address] and per staff she was sitting with other members of the facility engaging in conversation, when she stood she and was noted to be disoriented, staff lowered her to the ground and reported a loss of consciousness for approximately 3 minutes  During her ED evaluation chest x-ray showed no acute cardiopulmonary disease, laboratory values revealed no acute abnormalities and she remains hemodynamically stable  During my evaluation she is alert and oriented to self, able to be reoriented to time and situation  She is mildly hypertensive  Per niece/POA she does have a history of hypotension, but denies previous syncopal episodes  Will admit patient to medical-surgical unit with telemetry monitoring in the setting of syncopal and collapse  Review of Systems:    Review of Systems   Constitutional: Negative for chills, fatigue and fever  Eyes: Negative for visual disturbance  Respiratory: Negative for apnea, cough, choking, chest tightness, shortness of breath, wheezing and stridor  Cardiovascular: Negative for chest pain, palpitations and leg swelling  Gastrointestinal: Negative for abdominal distention, abdominal pain, anal bleeding, blood in stool, constipation, diarrhea, nausea, rectal pain and vomiting  Genitourinary: Negative for vaginal pain  Musculoskeletal: Negative for arthralgias, myalgias, neck pain and neck stiffness  Skin: Negative for rash and wound  Allergic/Immunologic: Negative for immunocompromised state  Neurological: Positive for syncope  Negative for weakness  Psychiatric/Behavioral: Negative for agitation  All other systems reviewed and are negative        Past Medical and Surgical History:     Past Medical History:   Diagnosis Date    GERD (gastroesophageal reflux disease)        No past surgical history on file  Meds/Allergies:    Prior to Admission medications    Medication Sig Start Date End Date Taking? Authorizing Provider   citalopram (CeleXA) 20 mg tablet Take 20 mg by mouth daily    Historical Provider, MD   cyanocobalamin 1000 MCG tablet Inject 1,000 mcg into the shoulder, thigh, or buttocks every 30 (thirty) days    Historical Provider, MD   fludrocortisone (FLORINEF) 0 1 mg tablet Take 0 1 mg by mouth daily    Historical Provider, MD   pantoprazole (PROTONIX) 40 mg tablet Take 40 mg by mouth daily    Historical Provider, MD     I have reviewed home medications with patient personally  Allergies: Allergies   Allergen Reactions    Bactrim [Sulfamethoxazole-Trimethoprim] Hives       Social History:     Marital Status:    Patient Pre-hospital Living Situation: [facility] memory unit/assistive living  Patient Pre-hospital Level of Mobility:  Assistive device dependent-walker  Patient Pre-hospital Diet Restrictions: None  Substance Use History:   Social History     Substance and Sexual Activity   Alcohol Use No     Social History     Tobacco Use   Smoking Status Never Smoker     Social History     Substance and Sexual Activity   Drug Use No       Family History:    non-contributory    Physical Exam:     Vitals:   Blood Pressure: 134/61 (08/22/19 2115)  Pulse: 73 (08/22/19 2100)  Temperature: 97 9 °F (36 6 °C) (08/22/19 1550)  Temp Source: Temporal (08/22/19 1550)  Respirations: 15 (08/22/19 2100)  Height: 4' 8" (142 2 cm) (08/22/19 1857)  Weight - Scale: 56 7 kg (125 lb) (08/22/19 1857)  SpO2: 93 % (08/22/19 2100)    Physical Exam   Nursing note and vitals reviewed  General: VSS, NAD  Pleasant elderly female lying in bed  Head: NCAT, NT  Eyes: PERRL, EOMI    ENT: MMM, Pharynx normal    Neck: Trachea midline  No JVD  CV: RRR  No M/R/G  Lungs: CTAB,  No wheezes, rales  Abd: +BS, soft, NT/ND   No guarding/rebound   MSK: Full ROM B/L UE/LE  No lower extremity edema  Skin:  Warm and dry No abrasions, lacerations  Neuro: AAOx2, GCS 15, no focal neuro deficits      Additional Data:     Lab Results: I have personally reviewed pertinent reports  Results from last 7 days   Lab Units 08/22/19  1641   WBC Thousand/uL 9 49   HEMOGLOBIN g/dL 14 6   HEMATOCRIT % 47 0*   PLATELETS Thousands/uL 303   NEUTROS PCT % 71   LYMPHS PCT % 16   MONOS PCT % 8   EOS PCT % 3     Results from last 7 days   Lab Units 08/22/19  1641   SODIUM mmol/L 143   POTASSIUM mmol/L 4 5   CHLORIDE mmol/L 106   CO2 mmol/L 29   BUN mg/dL 19   CREATININE mg/dL 0 73   ANION GAP mmol/L 8   CALCIUM mg/dL 9 2   ALBUMIN g/dL 3 3*   TOTAL BILIRUBIN mg/dL 0 40   ALK PHOS U/L 49   ALT U/L 9*   AST U/L 27   GLUCOSE RANDOM mg/dL 169*                       Imaging: I have personally reviewed pertinent reports  XR chest portable   ED Interpretation by Marleen Alvarez DO (08/22 1732)   No acute abnl      Final Result by Brennan Harris DO (08/22 1811)      No acute cardiopulmonary disease  Right middle lobe nodule described on the previous study is not discernible on this exam   Has the patient had outside follow-up CT imaging as previously recommended? If not, consider nonemergent CT chest to document two-year stability  The study was marked in [facility] for immediate notification  Workstation performed: [id number]             EKG, Pathology, and Other Studies Reviewed on Admission:   EKG:  Normal sinus rhythm 67,   Allscripts / Epic Records Reviewed: Yes     ** Please Note: This note has been constructed using a voice recognition system   **

## 2019-08-23 NOTE — PLAN OF CARE
Problem: Prexisting or High Potential for Compromised Skin Integrity  Goal: Skin integrity is maintained or improved  Description  INTERVENTIONS:  - Identify patients at risk for skin breakdown  - Assess and monitor skin integrity  - Assess and monitor nutrition and hydration status  - Monitor labs   - Assess for incontinence   - Turn and reposition patient  - Assist with mobility/ambulation  - Relieve pressure over bony prominences  - Avoid friction and shearing  - Provide appropriate hygiene as needed including keeping skin clean and dry  - Evaluate need for skin moisturizer/barrier cream  - Collaborate with interdisciplinary team   - Patient/family teaching  - Consider wound care consult   Outcome: Progressing     Problem: PAIN - ADULT  Goal: Verbalizes/displays adequate comfort level or baseline comfort level  Description  Interventions:  - Encourage patient to monitor pain and request assistance  - Assess pain using appropriate pain scale  - Administer analgesics based on type and severity of pain and evaluate response  - Implement non-pharmacological measures as appropriate and evaluate response  - Consider cultural and social influences on pain and pain management  - Notify physician/advanced practitioner if interventions unsuccessful or patient reports new pain  Outcome: Progressing     Problem: INFECTION - ADULT  Goal: Absence or prevention of progression during hospitalization  Description  INTERVENTIONS:  - Assess and monitor for signs and symptoms of infection  - Monitor lab/diagnostic results  - [place] appropriate cooling/warming therapies per order  - Administer medications as ordered  - Instruct and encourage patient and family to use good hand hygiene technique  - Identify and instruct in appropriate isolation precautions for identified infection/condition   Outcome: Progressing     Problem: SAFETY ADULT  Goal: Patient will remain free of falls  Description  INTERVENTIONS:  - Assess patient frequently for physical needs  -  Identify cognitive and physical deficits and behaviors that affect risk of falls  -  [place] fall precautions as indicated by assessment   - Educate patient/family on patient safety including physical limitations  - Instruct patient to call for assistance with activity based on assessment  - Modify environment to reduce risk of injury  - Consider OT/PT consult to assist with strengthening/mobility  Outcome: Progressing     Problem: DISCHARGE PLANNING  Goal: Discharge to home or other facility with appropriate resources  Description  INTERVENTIONS:  - Identify barriers to discharge w/patient and caregiver  - Arrange for needed discharge resources and transportation as appropriate  - Identify discharge learning needs (meds, wound care, etc )  - Arrange for interpretive services to assist at discharge as needed  - Refer to Case Management Department for coordinating discharge planning if the patient needs post-hospital services based on physician/advanced practitioner order or complex needs related to functional status, cognitive ability, or social support system  Outcome: Progressing     Problem: Knowledge Deficit  Goal: Patient/family/caregiver demonstrates understanding of disease process, treatment plan, medications, and discharge instructions  Description  Complete learning assessment and assess knowledge base    Interventions:  - Provide teaching at level of understanding  - Provide teaching via preferred learning methods  Outcome: Progressing     Problem: SKIN/TISSUE INTEGRITY - ADULT  Goal: Skin integrity remains intact  Description  INTERVENTIONS  - Identify patients at risk for skin breakdown  - Assess and monitor skin integrity  - Assess and monitor nutrition and hydration status  - Monitor labs (i e  albumin)  - Assess for incontinence   - Turn and reposition patient  - Assist with mobility/ambulation  - Relieve pressure over bony prominences  - Avoid friction and shearing  - Provide appropriate hygiene as needed including keeping skin clean and dry  - Evaluate need for skin moisturizer/barrier cream  - Collaborate with interdisciplinary team (i e  Nutrition, Rehabilitation, etc )   - Patient/family teaching  Outcome: Progressing     Problem: HEMATOLOGIC - ADULT  Goal: Maintains hematologic stability  Description  INTERVENTIONS  - Assess for signs and symptoms of bleeding or hemorrhage  - Monitor labs  - Administer supportive blood products/factors as ordered and appropriate  Outcome: Progressing

## 2019-08-23 NOTE — UTILIZATION REVIEW
Initial Clinical Review    Admission: Date/Time/Statement: Inpatient Admission Orders (From admission, onward)     Ordered        08/22/19 1931  Inpatient Admission  Once                   Orders Placed This Encounter   Procedures    Inpatient Admission     Standing Status:   Standing     Number of Occurrences:   1     Order Specific Question:   Admitting Physician     Answer:   Nikhil Steele [[id number]]     Order Specific Question:   Level of Care     Answer:   Med Surg [[id number]]     Order Specific Question:   Estimated length of stay     Answer:   More than 2 Midnights     Order Specific Question:   Certification     Answer:   I certify that inpatient services are medically necessary for this patient for a duration of greater than two midnights  See H&P and MD Progress Notes for additional information about the patient's course of treatment  ED Arrival Information     Expected Arrival Acuity Means of Arrival Escorted By Service Admission Type    - 8/22/2019 15:42 Urgent Ambulance [facility] General Medicine Urgent    Arrival Complaint    syncope        Chief Complaint   Patient presents with    Syncope     patient had an assited fall, family reports 3 mins of unresponsiveness      Assessment/Plan: This is a 80year old female from assisted living to ED via EMS admitted as inpatient due to syncope with differential diagnosis of vasovagal, cardiac arhythmia, ACS, metabolic abnormality and dehydration  Presented syncopal event lasting about 3 minutes after standing on the patio  Per staff, SBP 70  On exam disoriented  UA moderate bacteria, leukocytes, positive nitrite  WBC 9 49  EKG sinus with no abnormality  Telemetry, IV hydration  in progress  Cardiology consulted  On 8/23 - has UTI and Ceftin started  Urine cultures added to UA from yesterday     On exam pleasantly demented     ED Triage Vitals [08/22/19 1550]   Temperature Pulse Respirations Blood Pressure SpO2   97 9 °F (36 6 °C) 65 18 144/66 100 % Temp Source Heart Rate Source Patient Position - Orthostatic VS BP Location FiO2 (%)   Temporal Monitor Lying Left arm --      Pain Score       No Pain        Wt Readings from Last 1 Encounters:   08/23/19 56 6 kg (124 lb 12 5 oz)     Additional Vital Signs:   08/23/19 0747  97 6 °F (36 4 °C)  64  16  132/63  94 %  None (Room air)  Lying   08/23/19 0136        129/67      Sitting - Orthostatic VS   08/23/19 0135        133/70      Sitting - Orthostatic VS   08/23/19 0134        142/62      Lying - Orthostatic VS       Pertinent Labs/Diagnostic Test Results:   8/22/2019 EKG - sinus  Normal ST and T   8/22/2109 CxR - No acute cardiopulmonary disease  Right middle lobe nodule described on the previous study is not discernible on this exam    8/23/2019  ECHO - LEFT VENTRICLE:  Systolic function was normal  Ejection fraction was estimated to be 65 %  Although no diagnostic regional wall motion abnormality was identified, this possibility cannot be completely excluded on the basis of this study  Doppler parameters were consistent with abnormal left ventricular relaxation (grade 1 diastolic dysfunction)      LEFT ATRIUM:  The atrium was mildly dilated      MITRAL VALVE:  There was mild annular calcification    There was mild regurgitation      TRICUSPID VALVE:  There was mild regurgitation  Results from last 7 days   Lab Units 08/23/19  0405 08/22/19  1641   WBC Thousand/uL 7 31 9 49   HEMOGLOBIN g/dL 13 0 14 6   HEMATOCRIT % 41 4 47 0*   PLATELETS Thousands/uL 303 303   NEUTROS ABS Thousands/µL 4 46 6 78         Results from last 7 days   Lab Units 08/23/19  0405 08/22/19  1641   SODIUM mmol/L 144 143   POTASSIUM mmol/L 3 6 4 5   CHLORIDE mmol/L 108 106   CO2 mmol/L 26 29   ANION GAP mmol/L 10 8   BUN mg/dL 16 19   CREATININE mg/dL 0 55* 0 73   EGFR ml/min/1 73sq m 81 71   CALCIUM mg/dL 8 6 9 2   MAGNESIUM mg/dL 2 0  --      Results from last 7 days   Lab Units 08/22/19  1641   AST U/L 27   ALT U/L 9* ALK PHOS U/L 49   TOTAL PROTEIN g/dL 6 8   ALBUMIN g/dL 3 3*   TOTAL BILIRUBIN mg/dL 0 40     Results from last 7 days   Lab Units 08/23/19  0405 08/22/19  1641   GLUCOSE RANDOM mg/dL 119 169*     Results from last 7 days   Lab Units 08/23/19  0405 08/23/19  0117 08/22/19  2221   TROPONIN I ng/mL 0 02 <0 02 <0 02       Results from last 7 days   Lab Units 08/22/19  1948   CLARITY UA  Slightly Cloudy   COLOR UA  Yellow   SPEC GRAV UA  1 010   PH UA  8 0   GLUCOSE UA mg/dl Negative   KETONES UA mg/dl Negative   BLOOD UA  Negative   PROTEIN UA mg/dl Negative   NITRITE UA  Positive*   BILIRUBIN UA  Negative   UROBILINOGEN UA E U /dl 0 2   LEUKOCYTES UA  Small*   WBC UA /hpf 4-10*   RBC UA /hpf None Seen   BACTERIA UA /hpf Moderate*   EPITHELIAL CELLS WET PREP /hpf Occasional       ED Treatment:   Medication Administration from 08/22/2019 1542 to 08/22/2019 2154       Date/Time Order Dose Route Action Comments     08/22/2019 1852 sodium chloride 0 9 % bolus 1,000 mL 1,000 mL Intravenous New Bag         Past Medical History:   Diagnosis Date    Dementia     Depression     GERD (gastroesophageal reflux disease)     Macular degeneration     Parkinson disease ([facility] [address] )     Vitamin B deficiency      Present on Admission:   Syncope   GERD (gastroesophageal reflux disease)      Admitting Diagnosis: Syncope [R55]  Age/Sex: 80 y o  female  Admission Orders: 8/22/2109  1931 INPATIENT     Current Facility-Administered Medications:  acetaminophen 650 mg Oral Q6H PRN    aspirin 81 mg Oral Daily    carbidopa-levodopa 1 tablet Oral 4x Daily    citalopram 10 mg Oral Daily    docusate sodium 100 mg Oral BID    donepezil 10 mg Oral HS    furosemide 20 mg Oral Daily    heparin (porcine) 5,000 Units Subcutaneous Q8H [address]    melatonin 6 mg Oral HS    midodrine 2 5 mg Oral Daily PRN    multi-electrolyte 50 mL/hr Intravenous Continuous Last Rate: 50 mL/hr (08/22/19 2313)   ondansetron 4 mg Intravenous Q6H PRN    pantoprazole 40 mg Oral Early Morning    polyethylene glycol 17 g Oral Daily PRN    senna 1 tablet Oral Daily    No prn medication used  IP CONSULT TO CARDIOLOGY  telemetry      Network Utilization Review Department  Phone: [phone]; Fax [phone]  [email or web address]  ATTENTION: Please call with any questions or concerns to [phone]  and carefully listen to the prompts so that you are directed to the right person  Send all requests for admission clinical reviews, approved or denied determinations and any other requests to fax [phone]   All voicemails are confidential

## 2019-08-23 NOTE — PHYSICAL THERAPY NOTE
PT eval   08/23/19 1240   Note Type   Note type Eval only   Pain Assessment   Pain Assessment No/denies pain   Pain Score No Pain   Home Living   Type of Home Assisted living  (Memory care)   Home Equipment Walker   Prior Function   Level of [place] Needs assistance with ADLs and functional mobility; Needs assistance with IADLs   Lives With Facility staff   Receives Help From Personal care attendant   ADL Assistance Needs assistance   IADLs Needs assistance   Restrictions/Precautions   Weight Bearing Precautions Per Order No   Other Precautions Visual impairment  (legally blind)   General   Additional Pertinent History adm for syncope   Family/Caregiver Present Yes  (niece)   Cognition   Overall Cognitive Status Impaired   Attention Within functional limits   Orientation Level Oriented to person   Memory Decreased long term memory;Decreased short term memory   Following Commands Follows one step commands with increased time or repetition   RUE Assessment   RUE Assessment WFL   LUE Assessment   LUE Assessment WFL  (some limit life-long)   RLE Assessment   RLE Assessment WFL   LLE Assessment   LLE Assessment WFL  (slight weakness; life long)   Coordination   Movements are Fluid and Coordinated 1   Proprioception   RLE Proprioception Grossly intact   LLE Proprioception Grossly Intact   Bed Mobility   Supine to Sit 4  Minimal assistance   Additional items Assist x 1  (to scoot to edge of bed)   Transfers   Sit to Stand 5  Supervision   Stand to Sit 5  Supervision   Additional items Verbal cues;Armrests   Stand pivot 4  Minimal assistance   Additional items Assist x 1  (assist for handplacement and rw management)   Ambulation/Elevation   Gait pattern Forward Flexion; Inconsistent mumtaz; Short stride; Step to;Excessively slow   Gait Assistance 4  Minimal assist   Additional items Assist x 1;Verbal cues; Tactile cues   Assistive Device Rolling walker   Distance 5'x2   Balance   Static Sitting Normal   Dynamic Sitting Good   Static Standing Fair +   Dynamic Standing Fair   Ambulatory Fair   Endurance Deficit   Endurance Deficit No   Activity Tolerance   Activity Tolerance Patient tolerated treatment well  (pt incontinient of urine assisted to toilet, cleanse and henrik)   Nurse Made Aware FADI Vazquez   Assessment   Prognosis Good   Problem List Impaired balance;Decreased mobility   Assessment Pt able to mobilize form bed-commode - chair with rw and assistance  Family member states pt is near baseline level of function  will benefit form ksilled PT serivcse to improve safety with transfer and ambulation  Approraptie to return to half-way at d/c with follow up home PT   Will follow see goals   Barriers to Discharge   (medical status)   Goals   Patient Goals get better   [facility] Expiration Date 08/30/19   Short Term Goal #1 1) safe transfers with supervision 2) safe abm with rw 100' level supervision of 1   Plan   Treatment/Interventions ADL retraining;Functional transfer training;Gait training;Patient/family training;Equipment eval/education   PT Frequency 5x/wk   Recommendation   Recommendation Home PT  (return to half-way with home PT)   Equipment Recommended Walker   Barthel Index   Feeding 5   Bathing 0   Grooming Score 0   Dressing Score 0   Bladder Score 0   Bowels Score 5   Toilet Use Score 5   Transfers (Bed/Chair) Score 10   Mobility (Level Surface) Score 0   Stairs Score 0   Barthel Index Score 25   Zari Lanier, PT

## 2019-08-23 NOTE — OCCUPATIONAL THERAPY NOTE
Occupational Therapy Evaluation      Yue Beaver    8/23/2019    Patient Active Problem List   Diagnosis    Syncope    GERD (gastroesophageal reflux disease)    Dementia    Parkinson disease ([facility] )    UTI (urinary tract infection)       Past Medical History:   Diagnosis Date    Dementia     Depression     GERD (gastroesophageal reflux disease)     Macular degeneration     Parkinson disease ([facility] )     Vitamin B deficiency        No past surgical history on file  08/23/19 9629   Note Type   Note type Eval only   Restrictions/Precautions   Weight Bearing Precautions Per Order No   Other Precautions Visual impairment;Hard of hearing; Fall Risk;Multiple lines;Telemetry; Chair Alarm;Cognitive;Contact/isolation   Pain Assessment   Pain Assessment No/denies pain   Home Living   Type of Home Assisted living  (Memory Care unit at [facility]   Prior Function   Level of [place]   (Mod I w RW for functional mobility; assisted ADL/IADL)   Lives With Facility staff   Receives Help From Personal care attendant   ADL Assistance Needs assistance   IADLs Needs assistance   Psychosocial   Psychosocial (WDL) WDL   Length of Time/Family Visitation   (Niece present; confirmed baseline info)   ADL   Eating Assistance 5  Supervision/Setup   Eating Deficit Setup;Verbal cueing; Increased time to complete   Grooming Assistance 5  Supervision/Setup   Grooming Deficit Setup;Verbal cueing; Increased time to complete   UB Bathing Assistance 3  Moderate Assistance   LB Bathing Assistance 3  Moderate Assistance   UB Dressing Assistance 3  Moderate Assistance   LB Dressing Assistance 3  Moderate [address]  3  Moderate Assistance   Toileting Deficit Clothing management up;Clothing management down;Perineal hygiene; Bedside commode   Bed Mobility   Sit to Supine 4  Minimal assistance   Transfers   Sit to Stand 5  Supervision   Stand to Sit 5  Supervision   Stand pivot 4  Minimal assistance Activity Tolerance   Activity Tolerance Patient tolerated treatment well   Nurse Made Aware FADI CLARK Assessment   RUE Assessment WFL   LUE Assessment   LUE Assessment WFL  (Niece notes some deficits with LUE since birth)   Hand Function   Gross Motor Coordination Functional   Fine Motor Coordination Impaired   Cognition   Overall Cognitive Status Impaired   Arousal/Participation Alert; Cooperative   Attention Within functional limits   Orientation Level Oriented to person   Memory Decreased short term memory;Decreased long term memory   Following Commands Follows one step commands without difficulty   Assessment   Limitation Decreased ADL status; Decreased endurance;Decreased self-care trans   Prognosis Good   Assessment Pt is a 80 y o  female seen for OT evaluation at [facility], admitted 8/22/2019 w/ Syncope  OT completed expanded review of pt's  medical and social history  Comorbidities affecting pt's functional performance at time of assessment include: dementia, parkinsons disease, UTI  Prior to admission, pt was living at [address] and was assisted with ADLs but able to use RW to walk to meals and transfer self  Upon evaluation, pt presents to OT below baseline due to the following performance deficits: weakness, decreased balance and decreased tolerance  Pt to benefit from continued skilled OT tx while in the hospital to address deficits as defined above and maximize level of functional independence w ADL's and functional mobility  Occupational Performance areas to address include: eating, grooming, toilet hygiene, functional mobility and functional transfers  Based on findings, pt is of moderate complexity  At this time, OT recommendations at time of discharge are return to [address] with 24hr assist as needed  Plan   Treatment Interventions ADL retraining;Functional transfer training; Endurance training;Patient/family training   Goal Expiration Date 09/03/19   OT Frequency 2-3x/wk   Recommendation   OT Discharge Recommendation 24 hour supervision/assist  (Return to [facility] memory care unit)   OT - OK to Discharge Yes  (when medically stable)   Barthel Index   Feeding 5   Bathing 0   Grooming Score 0   Dressing Score 0   Bladder Score 0   Bowels Score 0   Toilet Use Score 5   Transfers (Bed/Chair) Score 10   Mobility (Level Surface) Score 0   Stairs Score 0   Barthel Index Score 20     Pt will achieve the following goals within 10 days  *Pt will complete toileting (hygiene and clothing management) with supervision  *Pt will complete bed mobility with supervision, with bed flat and no side rail to prep for purposeful tasks    *Pt will perform functional transfers with Mod I in order to complete ADL routine  *Pt will demonstrate increased activity tolerance in order to complete ADL routine      [facility], OT

## 2019-08-24 VITALS
RESPIRATION RATE: 18 BRPM | HEART RATE: 70 BPM | WEIGHT: 127.5 LBS | DIASTOLIC BLOOD PRESSURE: 67 MMHG | TEMPERATURE: 98 F | BODY MASS INDEX: 28.68 KG/M2 | OXYGEN SATURATION: 94 % | HEIGHT: 56 IN | SYSTOLIC BLOOD PRESSURE: 134 MMHG

## 2019-08-24 PROBLEM — R55 SYNCOPE: Status: RESOLVED | Noted: 2019-08-22 | Resolved: 2019-08-24

## 2019-08-24 LAB
ANION GAP SERPL CALCULATED.3IONS-SCNC: 8 MMOL/L (ref 4–13)
BASOPHILS # BLD AUTO: 0.09 THOUSANDS/ΜL (ref 0–0.1)
BASOPHILS NFR BLD AUTO: 1 % (ref 0–1)
BUN SERPL-MCNC: 12 MG/DL (ref 5–25)
CALCIUM SERPL-MCNC: 8.8 MG/DL (ref 8.3–10.1)
CHLORIDE SERPL-SCNC: 103 MMOL/L (ref 100–108)
CO2 SERPL-SCNC: 30 MMOL/L (ref 21–32)
CREAT SERPL-MCNC: 0.68 MG/DL (ref 0.6–1.3)
EOSINOPHIL # BLD AUTO: 0.32 THOUSAND/ΜL (ref 0–0.61)
EOSINOPHIL NFR BLD AUTO: 4 % (ref 0–6)
ERYTHROCYTE [DISTWIDTH] IN BLOOD BY AUTOMATED COUNT: 13.2 % (ref 11.6–15.1)
GFR SERPL CREATININE-BSD FRML MDRD: 76 ML/MIN/1.73SQ M
GLUCOSE SERPL-MCNC: 119 MG/DL (ref 65–140)
HCT VFR BLD AUTO: 43.7 % (ref 34.8–46.1)
HGB BLD-MCNC: 13.8 G/DL (ref 11.5–15.4)
IMM GRANULOCYTES # BLD AUTO: 0.04 THOUSAND/UL (ref 0–0.2)
IMM GRANULOCYTES NFR BLD AUTO: 1 % (ref 0–2)
LYMPHOCYTES # BLD AUTO: 2.38 THOUSANDS/ΜL (ref 0.6–4.47)
LYMPHOCYTES NFR BLD AUTO: 31 % (ref 14–44)
MCH RBC QN AUTO: 29.2 PG (ref 26.8–34.3)
MCHC RBC AUTO-ENTMCNC: 31.6 G/DL (ref 31.4–37.4)
MCV RBC AUTO: 92 FL (ref 82–98)
MONOCYTES # BLD AUTO: 0.77 THOUSAND/ΜL (ref 0.17–1.22)
MONOCYTES NFR BLD AUTO: 10 % (ref 4–12)
MRSA NOSE QL CULT: NORMAL
NEUTROPHILS # BLD AUTO: 4.15 THOUSANDS/ΜL (ref 1.85–7.62)
NEUTS SEG NFR BLD AUTO: 53 % (ref 43–75)
NRBC BLD AUTO-RTO: 0 /100 WBCS
PLATELET # BLD AUTO: 307 THOUSANDS/UL (ref 149–390)
PMV BLD AUTO: 10.2 FL (ref 8.9–12.7)
POTASSIUM SERPL-SCNC: 4.4 MMOL/L (ref 3.5–5.3)
RBC # BLD AUTO: 4.73 MILLION/UL (ref 3.81–5.12)
SODIUM SERPL-SCNC: 141 MMOL/L (ref 136–145)
WBC # BLD AUTO: 7.75 THOUSAND/UL (ref 4.31–10.16)

## 2019-08-24 PROCEDURE — G8980 MOBILITY D/C STATUS: HCPCS

## 2019-08-24 PROCEDURE — 80048 BASIC METABOLIC PNL TOTAL CA: CPT | Performed by: INTERNAL MEDICINE

## 2019-08-24 PROCEDURE — 85025 COMPLETE CBC W/AUTO DIFF WBC: CPT | Performed by: INTERNAL MEDICINE

## 2019-08-24 PROCEDURE — 99239 HOSP IP/OBS DSCHRG MGMT >30: CPT | Performed by: INTERNAL MEDICINE

## 2019-08-24 PROCEDURE — 97530 THERAPEUTIC ACTIVITIES: CPT

## 2019-08-24 RX ORDER — CEFUROXIME AXETIL 250 MG/1
250 TABLET ORAL EVERY 12 HOURS SCHEDULED
Qty: 6 TABLET | Refills: 0 | Status: SHIPPED | OUTPATIENT
Start: 2019-08-24 | End: 2019-08-27

## 2019-08-24 RX ADMIN — HEPARIN SODIUM 5000 UNITS: 5000 INJECTION, SOLUTION INTRAVENOUS; SUBCUTANEOUS at 05:18

## 2019-08-24 RX ADMIN — CEFUROXIME AXETIL 250 MG: 250 TABLET, FILM COATED ORAL at 09:34

## 2019-08-24 RX ADMIN — FUROSEMIDE 20 MG: 20 TABLET ORAL at 09:34

## 2019-08-24 RX ADMIN — PANTOPRAZOLE SODIUM 40 MG: 40 TABLET, DELAYED RELEASE ORAL at 05:19

## 2019-08-24 RX ADMIN — CARBIDOPA AND LEVODOPA 1 TABLET: 25; 100 TABLET ORAL at 09:34

## 2019-08-24 RX ADMIN — CITALOPRAM HYDROBROMIDE 10 MG: 10 TABLET ORAL at 09:34

## 2019-08-24 RX ADMIN — DOCUSATE SODIUM 100 MG: 100 CAPSULE, LIQUID FILLED ORAL at 09:34

## 2019-08-24 RX ADMIN — ASPIRIN 81 MG: 81 TABLET, COATED ORAL at 09:34

## 2019-08-24 RX ADMIN — SENNOSIDES 8.6 MG: 8.6 TABLET, FILM COATED ORAL at 09:34

## 2019-08-24 NOTE — DISCHARGE INSTRUCTIONS
Follow-up with PCP in 1 week  Return to ER with any dizziness, syncopal episode, chest pain, palpitation or any other alarming symptoms

## 2019-08-24 NOTE — NURSING NOTE
Discharge instructions reviewed with niece and receiving facility, [facility]  Patient's niece transported patient back to [facility]

## 2019-08-24 NOTE — PLAN OF CARE
Problem: Prexisting or High Potential for Compromised Skin Integrity  Goal: Skin integrity is maintained or improved  Description  INTERVENTIONS:  - Identify patients at risk for skin breakdown  - Assess and monitor skin integrity  - Assess and monitor nutrition and hydration status  - Monitor labs   - Assess for incontinence   - Turn and reposition patient  - Assist with mobility/ambulation  - Relieve pressure over bony prominences  - Avoid friction and shearing  - Provide appropriate hygiene as needed including keeping skin clean and dry  - Evaluate need for skin moisturizer/barrier cream  - Collaborate with interdisciplinary team   - Patient/family teaching  - Consider wound care consult   Outcome: Progressing     Problem: PAIN - ADULT  Goal: Verbalizes/displays adequate comfort level or baseline comfort level  Description  Interventions:  - Encourage patient to monitor pain and request assistance  - Assess pain using appropriate pain scale  - Administer analgesics based on type and severity of pain and evaluate response  - Implement non-pharmacological measures as appropriate and evaluate response  - Consider cultural and social influences on pain and pain management  - Notify physician/advanced practitioner if interventions unsuccessful or patient reports new pain  Outcome: Progressing     Problem: INFECTION - ADULT  Goal: Absence or prevention of progression during hospitalization  Description  INTERVENTIONS:  - Assess and monitor for signs and symptoms of infection  - Monitor lab/diagnostic results  - [place] appropriate cooling/warming therapies per order  - Administer medications as ordered  - Instruct and encourage patient and family to use good hand hygiene technique  - Identify and instruct in appropriate isolation precautions for identified infection/condition   Outcome: Progressing     Problem: SAFETY ADULT  Goal: Patient will remain free of falls  Description  INTERVENTIONS:  - Assess patient frequently for physical needs  -  Identify cognitive and physical deficits and behaviors that affect risk of falls  -  [place] fall precautions as indicated by assessment   - Educate patient/family on patient safety including physical limitations  - Instruct patient to call for assistance with activity based on assessment  - Modify environment to reduce risk of injury  - Consider OT/PT consult to assist with strengthening/mobility  Outcome: Progressing     Problem: DISCHARGE PLANNING  Goal: Discharge to home or other facility with appropriate resources  Description  INTERVENTIONS:  - Identify barriers to discharge w/patient and caregiver  - Arrange for needed discharge resources and transportation as appropriate  - Identify discharge learning needs (meds, wound care, etc )  - Arrange for interpretive services to assist at discharge as needed  - Refer to Case Management Department for coordinating discharge planning if the patient needs post-hospital services based on physician/advanced practitioner order or complex needs related to functional status, cognitive ability, or social support system  Outcome: Progressing     Problem: Knowledge Deficit  Goal: Patient/family/caregiver demonstrates understanding of disease process, treatment plan, medications, and discharge instructions  Description  Complete learning assessment and assess knowledge base    Interventions:  - Provide teaching at level of understanding  - Provide teaching via preferred learning methods  Outcome: Progressing     Problem: SKIN/TISSUE INTEGRITY - ADULT  Goal: Skin integrity remains intact  Description  INTERVENTIONS  - Identify patients at risk for skin breakdown  - Assess and monitor skin integrity  - Assess and monitor nutrition and hydration status  - Monitor labs (i e  albumin)  - Assess for incontinence   - Turn and reposition patient  - Assist with mobility/ambulation  - Relieve pressure over bony prominences  - Avoid friction and shearing  - Provide appropriate hygiene as needed including keeping skin clean and dry  - Evaluate need for skin moisturizer/barrier cream  - Collaborate with interdisciplinary team (i e  Nutrition, Rehabilitation, etc )   - Patient/family teaching  Outcome: Progressing     Problem: HEMATOLOGIC - ADULT  Goal: Maintains hematologic stability  Description  INTERVENTIONS  - Assess for signs and symptoms of bleeding or hemorrhage  - Monitor labs  - Administer supportive blood products/factors as ordered and appropriate  Outcome: Progressing     Problem: Potential for Falls  Goal: Patient will remain free of falls  Description  INTERVENTIONS:  - Assess patient frequently for physical needs  -  Identify cognitive and physical deficits and behaviors that affect risk of falls    -  [place] fall precautions as indicated by assessment   - Educate patient/family on patient safety including physical limitations  - Instruct patient to call for assistance with activity based on assessment  - Modify environment to reduce risk of injury  - Consider OT/PT consult to assist with strengthening/mobility  Outcome: Progressing

## 2019-08-24 NOTE — ASSESSMENT & PLAN NOTE
Differential diagnosis including but not limited to: vasovagal syncope, cardiac arrhythmia, ACS, metabolic abnormality and dehydration  Neuro checks every 4 hours x 24 hours-Every 4 hours  Respiratory:  No acute issues, goal SpO2 greater than 94% -currently maintained on room air  · Cardiology consult appreciated  Patient's symptoms are likely orthostatic from dehydration  · Echocardiogram is unremarkable  Serial troponins are negative    GI:  Zofran p r n  Nausea vomiting,  :  Monitor I&Os, goal euvolemic  FEN:  Nursing dysphagia assessment, cardiac diet  Gentle IV hydration  Replete electrolytes with goals: K >4 0, Mag >2 0, and Phos >3 0  MSK:  Up with assistance only  At baseline patient uses walker  P T /OT evaluation noted    Cardiology cleared the patient for discharge  Patient was seen by Physical therapy and  will be discharged with VNA services

## 2019-08-24 NOTE — PLAN OF CARE
Problem: Prexisting or High Potential for Compromised Skin Integrity  Goal: Skin integrity is maintained or improved  Description  INTERVENTIONS:  - Identify patients at risk for skin breakdown  - Assess and monitor skin integrity  - Assess and monitor nutrition and hydration status  - Monitor labs   - Assess for incontinence   - Turn and reposition patient  - Assist with mobility/ambulation  - Relieve pressure over bony prominences  - Avoid friction and shearing  - Provide appropriate hygiene as needed including keeping skin clean and dry  - Evaluate need for skin moisturizer/barrier cream  - Collaborate with interdisciplinary team   - Patient/family teaching  - Consider wound care consult   8/24/2019 1144 by Yosef Abernathy RN  Outcome: Adequate for Discharge  8/24/2019 1142 by Yosef Abernathy RN  Outcome: Progressing     Problem: PAIN - ADULT  Goal: Verbalizes/displays adequate comfort level or baseline comfort level  Description  Interventions:  - Encourage patient to monitor pain and request assistance  - Assess pain using appropriate pain scale  - Administer analgesics based on type and severity of pain and evaluate response  - Implement non-pharmacological measures as appropriate and evaluate response  - Consider cultural and social influences on pain and pain management  - Notify physician/advanced practitioner if interventions unsuccessful or patient reports new pain  8/24/2019 1144 by Yosef Abernathy RN  Outcome: Adequate for Discharge  8/24/2019 1142 by Yosef Abernathy RN  Outcome: Progressing     Problem: INFECTION - ADULT  Goal: Absence or prevention of progression during hospitalization  Description  INTERVENTIONS:  - Assess and monitor for signs and symptoms of infection  - Monitor lab/diagnostic results  - [place] appropriate cooling/warming therapies per order  - Administer medications as ordered  - Instruct and encourage patient and family to use good hand hygiene technique  - Identify and instruct in appropriate isolation precautions for identified infection/condition   8/24/2019 1144 by Ruiz Rojas RN  Outcome: Adequate for Discharge  8/24/2019 1142 by Ruiz Rojas RN  Outcome: Progressing     Problem: SAFETY ADULT  Goal: Patient will remain free of falls  Description  INTERVENTIONS:  - Assess patient frequently for physical needs  -  Identify cognitive and physical deficits and behaviors that affect risk of falls  -  [place] fall precautions as indicated by assessment   - Educate patient/family on patient safety including physical limitations  - Instruct patient to call for assistance with activity based on assessment  - Modify environment to reduce risk of injury  - Consider OT/PT consult to assist with strengthening/mobility  8/24/2019 1144 by Ruiz Rojas RN  Outcome: Adequate for Discharge  8/24/2019 1142 by Ruiz Rojas RN  Outcome: Progressing     Problem: DISCHARGE PLANNING  Goal: Discharge to home or other facility with appropriate resources  Description  INTERVENTIONS:  - Identify barriers to discharge w/patient and caregiver  - Arrange for needed discharge resources and transportation as appropriate  - Identify discharge learning needs (meds, wound care, etc )  - Arrange for interpretive services to assist at discharge as needed  - Refer to Case Management Department for coordinating discharge planning if the patient needs post-hospital services based on physician/advanced practitioner order or complex needs related to functional status, cognitive ability, or social support system  8/24/2019 1144 by Ruiz Rojas RN  Outcome: Adequate for Discharge  8/24/2019 1142 by Ruiz Rojas RN  Outcome: Progressing     Problem: Knowledge Deficit  Goal: Patient/family/caregiver demonstrates understanding of disease process, treatment plan, medications, and discharge instructions  Description  Complete learning assessment and assess knowledge base    Interventions:  - Provide teaching at level of understanding  - Provide teaching via preferred learning methods  8/24/2019 1144 by Linda Herzog RN  Outcome: Adequate for Discharge  8/24/2019 1142 by Linda Herzog RN  Outcome: Progressing     Problem: SKIN/TISSUE INTEGRITY - ADULT  Goal: Skin integrity remains intact  Description  INTERVENTIONS  - Identify patients at risk for skin breakdown  - Assess and monitor skin integrity  - Assess and monitor nutrition and hydration status  - Monitor labs (i e  albumin)  - Assess for incontinence   - Turn and reposition patient  - Assist with mobility/ambulation  - Relieve pressure over bony prominences  - Avoid friction and shearing  - Provide appropriate hygiene as needed including keeping skin clean and dry  - Evaluate need for skin moisturizer/barrier cream  - Collaborate with interdisciplinary team (i e  Nutrition, Rehabilitation, etc )   - Patient/family teaching  8/24/2019 1144 by Linda Herzog RN  Outcome: Adequate for Discharge  8/24/2019 1142 by Linda Herzog RN  Outcome: Progressing     Problem: HEMATOLOGIC - ADULT  Goal: Maintains hematologic stability  Description  INTERVENTIONS  - Assess for signs and symptoms of bleeding or hemorrhage  - Monitor labs  - Administer supportive blood products/factors as ordered and appropriate  8/24/2019 1144 by Linda Herzog RN  Outcome: Adequate for Discharge  8/24/2019 1142 by Linda Herzog RN  Outcome: Progressing     Problem: Potential for Falls  Goal: Patient will remain free of falls  Description  INTERVENTIONS:  - Assess patient frequently for physical needs  -  Identify cognitive and physical deficits and behaviors that affect risk of falls    -  [place] fall precautions as indicated by assessment   - Educate patient/family on patient safety including physical limitations  - Instruct patient to call for assistance with activity based on assessment  - Modify environment to reduce risk of injury  - Consider OT/PT consult to assist with strengthening/mobility  8/24/2019 1144 by Linda Herzog RN  Outcome: Adequate for Discharge  8/24/2019 1142 by Shilpi Solis RN  Outcome: Progressing

## 2019-08-24 NOTE — PLAN OF CARE
Problem: PHYSICAL THERAPY ADULT  Goal: Performs mobility at highest level of function for planned discharge setting  See evaluation for individualized goals  Description  Treatment/Interventions: ADL retraining, Functional transfer training, Gait training, Patient/family training, Equipment eval/education  Equipment Recommended: Khanh Boland       See flowsheet documentation for full assessment, interventions and recommendations  Outcome: Progressing  Note:   Prognosis: Good  Problem List: Decreased mobility, Decreased safety awareness  Assessment: Able to amb into br to wash up with assitance with rw and back out to chair to finish dressing  min a for safe tnasfer technique  pt usually has assist with ADLs at [facility]  RW too far away at times but responds to cues and assist to correct  No sob noted pt stefan activity well  Katelynn Granados Appropriate for d/c from mobility standpoint  Barriers to Discharge: (medical status)     Recommendation: [facility]     PT - OK to Discharge: Yes    See flowsheet documentation for full assessment

## 2019-08-24 NOTE — DISCHARGE SUMMARY
Discharge- Blair Zayas 1/1/1926, 80 y o  female MRN: [id number]    Unit/Bed#: [address] [place] [address] [id number] Encounter: [id number]    Primary Care Provider: Leana Cat DO   Date and time admitted to hospital: 8/22/2019  3:46 PM        UTI (urinary tract infection)  Assessment & Plan  Will discharge on Ceftin to complete a 3 day course    Parkinson disease ([address] )  Assessment & Plan  Continue Sinemet    Dementia  Assessment & Plan  No behavioral issues  Stable    GERD (gastroesophageal reflux disease)  Assessment & Plan  Protonix 40 mg daily    * Syncope  Assessment & Plan  Differential diagnosis including but not limited to: vasovagal syncope, cardiac arrhythmia, ACS, metabolic abnormality and dehydration  Neuro checks every 4 hours x 24 hours-Every 4 hours  Respiratory:  No acute issues, goal SpO2 greater than 94% -currently maintained on room air  · Cardiology consult appreciated  Patient's symptoms are likely orthostatic from dehydration  · Echocardiogram is unremarkable  Serial troponins are negative    GI:  Zofran p r n  Nausea vomiting,  :  Monitor I&Os, goal euvolemic  FEN:  Nursing dysphagia assessment, cardiac diet  Gentle IV hydration  Replete electrolytes with goals: K >4 0, Mag >2 0, and Phos >3 0  MSK:  Up with assistance only  At baseline patient uses walker  P T /OT evaluation noted  Cardiology cleared the patient for discharge  Patient was seen by Physical therapy and  will be discharged with VNA services      Hospital Course:     Blair Zayas is a 80 y o  female patient who originally presented to the hospital on   Admission Orders (From admission, onward)     Ordered        08/22/19 1931  Inpatient Admission  Once                  due to syncopal episode  Patient had serial troponins done which were negative  Patient was monitor on telemetry which was unremarkable  Patient was given IV fluids and was seen by Cardiology  Patient was started on Ceftin for possible UTI    Patient syncope was likely related to orthostatic brought on by dehydration  Echocardiogram showed ejection fraction of 65%  Patient is back to baseline and was cleared by Cardiology for discharge  Patient was seen by Physical therapy and  will be discharged with home care services including home PT  Follow-up with PCP in 1 week  Return to ER with any dizziness, syncopal episode, chest pain, palpitation or any other alarming symptoms  Please see above list of diagnoses and related plan for additional information  Condition at Discharge:  good      Discharge instructions/Information to patient and family:   See after visit summary for information provided to patient and family  Provisions for Follow-Up Care:  See after visit summary for information related to follow-up care and any pertinent home health orders  Disposition:     Home with VNA Services (Reminder: Complete face to face encounter)       Discharge Statement:  I spent 40 minutes discharging the patient  This time was spent on the day of discharge  I had direct contact with the patient on the day of discharge  Greater than 50% of the total time was spent examining patient, answering all patient questions, arranging and discussing plan of care with patient as well as directly providing post-discharge instructions  Additional time then spent on discharge activities  Discharge Medications:  See after visit summary for reconciled discharge medications provided to patient and family        ** Please Note: This note has been constructed using a voice recognition system **

## 2019-08-24 NOTE — PHYSICAL THERAPY NOTE
PT tx     08/24/19 0900   Pain Assessment   Pain Assessment No/denies pain   Pain Score No Pain   Restrictions/Precautions   Weight Bearing Precautions Per Order No   Other Precautions Cognitive; Chair Alarm; Bed Alarm;Telemetry   General   Chart Reviewed Yes   Additional Pertinent History tent for d/c today   Cognition   Overall Cognitive Status Impaired   Arousal/Participation Alert   Attention Attends with cues to redirect   Orientation Level Oriented to person   Memory Decreased long term memory;Decreased recall of precautions;Decreased recall of recent events;Decreased short term memory   Following Commands Follows one step commands with increased time or repetition   Subjective   Subjective pleasant agrees to mobilize and get washed up   Bed Mobility   Additional Comments oob in chair   Transfers   Sit to Stand 5  Supervision   Additional items Verbal cues;Armrests; Increased time required   Stand to Sit 5  Supervision   Additional items Verbal cues; Trapeze bar; Increased time required   Stand pivot 5  Supervision   Additional items Verbal cues;Armrests   Toilet transfer 4  Minimal assistance   Additional items Assist x 1;Standard toilet;Verbal cues; Increased time required  (guidance to rails)   Ambulation/Elevation   Gait pattern Shuffling; Inconsistent mumtaz; Short stride; Wide KELSY; Forward Flexion   Gait Assistance 4  Minimal assist   Additional items Assist x 1   Assistive Device Rolling walker   Distance 15'x2   Balance   Static Sitting Normal   Dynamic Sitting Good   Static Standing Fair +   Dynamic Standing Fair   Ambulatory Fair   Endurance Deficit   Endurance Deficit No   Activity Tolerance   Activity Tolerance Patient tolerated treatment well   Nurse Made Aware yes   Assessment   Prognosis Good   Problem List Decreased mobility; Decreased safety awareness   Assessment Able to amb into br to wash up with assitance with rw and back out to chair to finish dressing  min a for safe tnasfer technique   pt usually has assist with ADLs at [facility]  RW too far away at times but responds to cues and assist to correct  No sob noted pt stefan activity well  Alena Cristopher Appropriate for d/c from mobility standpoint   Goals   Patient Goals go home   Plan   Treatment/Interventions ADL retraining;Functional transfer training;Patient/family training;Equipment eval/education;Gait training;Spoke to nursing   Progress Improving as expected   PT Frequency 2-3x/wk   Recommendation   Recommendation Short-term skilled PT  (return to [facility])   Equipment Recommended Efren Mu   PT - OK to Discharge Yes   Reina Stallings, PT

## 2019-08-25 LAB — BACTERIA UR CULT: ABNORMAL

## 2019-08-28 NOTE — UTILIZATION REVIEW
Notification of Discharge  This is a Notification of Discharge from our facility [address]  Please be advised that this patient has been discharge from our facility  Below you will find the admission and discharge date and time including the patients disposition  PRESENTATION DATE: 8/22/2019  3:46 PM  OBS ADMISSION DATE:   IP ADMISSION DATE: 8/22/19 1931   DISCHARGE DATE: 8/24/2019 10:49 AM  DISPOSITION: Home with [facility] with [address] [facility]   Admission Orders listed below:  Admission Orders (From admission, onward)     Ordered        08/22/19 1931  Inpatient Admission  Once                   Please contact the UR Department if additional information is required to close this patient's authorization/case  [address]  Utilization Review Department  Phone: [phone]; Fax [phone]  [email or web address]  ATTENTION: Please call with any questions or concerns to [phone]  and carefully listen to the prompts so that you are directed to the right person  Send all requests for admission clinical reviews, approved or denied determinations and any other requests to fax [phone]   All voicemails are confidential

## 2019-12-07 ENCOUNTER — HOSPITAL ENCOUNTER (EMERGENCY)
Facility: HOSPITAL | Age: 84
Discharge: HOME | DRG: 392 | End: 2019-12-07
Attending: EMERGENCY MEDICINE | Admitting: EMERGENCY MEDICINE
Payer: COMMERCIAL

## 2019-12-07 ENCOUNTER — APPOINTMENT (EMERGENCY)
Dept: CT IMAGING | Facility: HOSPITAL | Age: 84
DRG: 392 | End: 2019-12-07
Payer: COMMERCIAL

## 2019-12-07 VITALS
SYSTOLIC BLOOD PRESSURE: 133 MMHG | RESPIRATION RATE: 17 BRPM | HEART RATE: 67 BPM | WEIGHT: 127.43 LBS | TEMPERATURE: 97.8 F | OXYGEN SATURATION: 97 % | BODY MASS INDEX: 28.66 KG/M2 | DIASTOLIC BLOOD PRESSURE: 60 MMHG | HEIGHT: 56 IN

## 2019-12-07 DIAGNOSIS — N39.0 URINARY TRACT INFECTION: Primary | ICD-10-CM

## 2019-12-07 LAB
AMORPH PHOS CRY URNS QL MICRO: ABNORMAL /HPF
ANION GAP SERPL CALCULATED.3IONS-SCNC: 10 MMOL/L (ref 4–13)
BACTERIA UR QL AUTO: ABNORMAL /HPF
BILIRUB UR QL STRIP: NEGATIVE
BUN SERPL-MCNC: 9 MG/DL (ref 5–25)
CALCIUM SERPL-MCNC: 9 MG/DL (ref 8.3–10.1)
CHLORIDE SERPL-SCNC: 104 MMOL/L (ref 100–108)
CLARITY UR: ABNORMAL
CO2 SERPL-SCNC: 27 MMOL/L (ref 21–32)
COLOR UR: YELLOW
CREAT SERPL-MCNC: 0.63 MG/DL (ref 0.6–1.3)
GFR SERPL CREATININE-BSD FRML MDRD: 78 ML/MIN/1.73SQ M
GLUCOSE SERPL-MCNC: 117 MG/DL (ref 65–140)
GLUCOSE UR STRIP-MCNC: NEGATIVE MG/DL
HGB UR QL STRIP.AUTO: NEGATIVE
KETONES UR STRIP-MCNC: NEGATIVE MG/DL
LEUKOCYTE ESTERASE UR QL STRIP: ABNORMAL
NITRITE UR QL STRIP: POSITIVE
NON-SQ EPI CELLS URNS QL MICRO: ABNORMAL /HPF
PH UR STRIP.AUTO: 8.5 [PH]
POTASSIUM SERPL-SCNC: 5 MMOL/L (ref 3.5–5.3)
PROT UR STRIP-MCNC: NEGATIVE MG/DL
RBC #/AREA URNS AUTO: ABNORMAL /HPF
SODIUM SERPL-SCNC: 141 MMOL/L (ref 136–145)
SP GR UR STRIP.AUTO: 1.01 (ref 1–1.03)
URINE COMMENT: ABNORMAL
UROBILINOGEN UR QL STRIP.AUTO: 0.2 E.U./DL
WBC #/AREA URNS AUTO: ABNORMAL /HPF

## 2019-12-07 PROCEDURE — 36415 COLL VENOUS BLD VENIPUNCTURE: CPT | Performed by: PHYSICIAN ASSISTANT

## 2019-12-07 PROCEDURE — 99284 EMERGENCY DEPT VISIT MOD MDM: CPT

## 2019-12-07 PROCEDURE — 70450 CT HEAD/BRAIN W/O DYE: CPT

## 2019-12-07 PROCEDURE — 99284 EMERGENCY DEPT VISIT MOD MDM: CPT | Performed by: PHYSICIAN ASSISTANT

## 2019-12-07 PROCEDURE — 72125 CT NECK SPINE W/O DYE: CPT

## 2019-12-07 PROCEDURE — 80048 BASIC METABOLIC PNL TOTAL CA: CPT | Performed by: PHYSICIAN ASSISTANT

## 2019-12-07 PROCEDURE — 81001 URINALYSIS AUTO W/SCOPE: CPT | Performed by: PHYSICIAN ASSISTANT

## 2019-12-07 RX ORDER — CEPHALEXIN 500 MG/1
500 CAPSULE ORAL EVERY 12 HOURS SCHEDULED
Qty: 10 CAPSULE | Refills: 0 | Status: SHIPPED | OUTPATIENT
Start: 2019-12-07 | End: 2019-12-13 | Stop reason: HOSPADM

## 2019-12-07 RX ORDER — CEPHALEXIN 250 MG/1
500 CAPSULE ORAL ONCE
Status: COMPLETED | OUTPATIENT
Start: 2019-12-07 | End: 2019-12-07

## 2019-12-07 RX ADMIN — CEPHALEXIN 500 MG: 250 CAPSULE ORAL at 16:47

## 2019-12-09 ENCOUNTER — HOSPITAL ENCOUNTER (EMERGENCY)
Facility: HOSPITAL | Age: 84
Discharge: HOME | DRG: 392 | End: 2019-12-09
Attending: EMERGENCY MEDICINE | Admitting: EMERGENCY MEDICINE
Payer: COMMERCIAL

## 2019-12-09 ENCOUNTER — APPOINTMENT (EMERGENCY)
Dept: RADIOLOGY | Facility: HOSPITAL | Age: 84
DRG: 392 | End: 2019-12-09
Payer: COMMERCIAL

## 2019-12-09 VITALS
SYSTOLIC BLOOD PRESSURE: 139 MMHG | HEIGHT: 56 IN | WEIGHT: 127.43 LBS | RESPIRATION RATE: 18 BRPM | HEART RATE: 85 BPM | TEMPERATURE: 99 F | BODY MASS INDEX: 28.66 KG/M2 | OXYGEN SATURATION: 98 % | DIASTOLIC BLOOD PRESSURE: 72 MMHG

## 2019-12-09 DIAGNOSIS — J20.9 ACUTE BRONCHITIS: Primary | ICD-10-CM

## 2019-12-09 DIAGNOSIS — R05.9 COUGH: ICD-10-CM

## 2019-12-09 PROCEDURE — 71046 X-RAY EXAM CHEST 2 VIEWS: CPT

## 2019-12-09 PROCEDURE — 99284 EMERGENCY DEPT VISIT MOD MDM: CPT | Performed by: EMERGENCY MEDICINE

## 2019-12-09 PROCEDURE — 99284 EMERGENCY DEPT VISIT MOD MDM: CPT

## 2019-12-09 RX ORDER — GUAIFENESIN/DEXTROMETHORPHAN 100-10MG/5
10 SYRUP ORAL ONCE
Status: COMPLETED | OUTPATIENT
Start: 2019-12-09 | End: 2019-12-09

## 2019-12-09 RX ORDER — GUAIFENESIN/DEXTROMETHORPHAN 100-10MG/5
10 SYRUP ORAL 3 TIMES DAILY PRN
Qty: 118 ML | Refills: 0 | Status: SHIPPED | OUTPATIENT
Start: 2019-12-09

## 2019-12-09 RX ADMIN — GUAIFENESIN AND DEXTROMETHORPHAN 10 ML: 100; 10 SYRUP ORAL at 22:26

## 2019-12-09 RX ADMIN — GUAIFENESIN AND DEXTROMETHORPHAN 10 ML: 100; 10 SYRUP ORAL at 22:43

## 2019-12-10 ENCOUNTER — APPOINTMENT (EMERGENCY)
Dept: RADIOLOGY | Facility: HOSPITAL | Age: 84
DRG: 392 | End: 2019-12-10
Payer: COMMERCIAL

## 2019-12-10 ENCOUNTER — HOSPITAL ENCOUNTER (INPATIENT)
Facility: HOSPITAL | Age: 84
LOS: 3 days | Discharge: HOME HEALTH | DRG: 392 | End: 2019-12-13
Attending: EMERGENCY MEDICINE | Admitting: HOSPITALIST
Payer: COMMERCIAL

## 2019-12-10 DIAGNOSIS — G20 PARKINSON DISEASE (HCC): ICD-10-CM

## 2019-12-10 DIAGNOSIS — A41.9 SEPSIS (HCC): Primary | ICD-10-CM

## 2019-12-10 PROBLEM — N39.0 UTI (URINARY TRACT INFECTION): Status: RESOLVED | Noted: 2019-08-23 | Resolved: 2019-12-10

## 2019-12-10 PROBLEM — R11.10 NON-INTRACTABLE VOMITING: Status: ACTIVE | Noted: 2019-12-10

## 2019-12-10 PROBLEM — G30.1 LATE ONSET ALZHEIMER'S DISEASE WITHOUT BEHAVIORAL DISTURBANCE (HCC): Status: ACTIVE | Noted: 2019-12-10

## 2019-12-10 PROBLEM — J69.0 ASPIRATION PNEUMONITIS (HCC): Status: ACTIVE | Noted: 2019-12-10

## 2019-12-10 PROBLEM — G30.1 LATE ONSET ALZHEIMER'S DISEASE WITHOUT BEHAVIORAL DISTURBANCE (HCC): Status: RESOLVED | Noted: 2019-12-10 | Resolved: 2019-12-10

## 2019-12-10 PROBLEM — D72.829 LEUKOCYTOSIS: Status: ACTIVE | Noted: 2019-12-10

## 2019-12-10 PROBLEM — F02.80 LATE ONSET ALZHEIMER'S DISEASE WITHOUT BEHAVIORAL DISTURBANCE (HCC): Status: ACTIVE | Noted: 2019-12-10

## 2019-12-10 PROBLEM — F02.80 LATE ONSET ALZHEIMER'S DISEASE WITHOUT BEHAVIORAL DISTURBANCE (HCC): Status: RESOLVED | Noted: 2019-12-10 | Resolved: 2019-12-10

## 2019-12-10 LAB
ALBUMIN SERPL BCP-MCNC: 3.5 G/DL (ref 3.5–5)
ALP SERPL-CCNC: 52 U/L (ref 46–116)
ALT SERPL W P-5'-P-CCNC: 11 U/L (ref 12–78)
ANION GAP SERPL CALCULATED.3IONS-SCNC: 9 MMOL/L (ref 4–13)
APTT PPP: 28 SECONDS (ref 23–37)
AST SERPL W P-5'-P-CCNC: 25 U/L (ref 5–45)
BACTERIA UR QL AUTO: NORMAL /HPF
BASOPHILS # BLD AUTO: 0.03 THOUSANDS/ΜL (ref 0–0.1)
BASOPHILS NFR BLD AUTO: 0 % (ref 0–1)
BILIRUB SERPL-MCNC: 0.6 MG/DL (ref 0.2–1)
BILIRUB UR QL STRIP: ABNORMAL
BUN SERPL-MCNC: 17 MG/DL (ref 5–25)
CALCIUM SERPL-MCNC: 9.5 MG/DL (ref 8.3–10.1)
CHLORIDE SERPL-SCNC: 106 MMOL/L (ref 100–108)
CLARITY UR: CLEAR
CO2 SERPL-SCNC: 29 MMOL/L (ref 21–32)
COLOR UR: YELLOW
CREAT SERPL-MCNC: 0.64 MG/DL (ref 0.6–1.3)
EOSINOPHIL # BLD AUTO: 0 THOUSAND/ΜL (ref 0–0.61)
EOSINOPHIL NFR BLD AUTO: 0 % (ref 0–6)
ERYTHROCYTE [DISTWIDTH] IN BLOOD BY AUTOMATED COUNT: 14.3 % (ref 11.6–15.1)
GFR SERPL CREATININE-BSD FRML MDRD: 77 ML/MIN/1.73SQ M
GLUCOSE SERPL-MCNC: 156 MG/DL (ref 65–140)
GLUCOSE UR STRIP-MCNC: NEGATIVE MG/DL
HCT VFR BLD AUTO: 49.4 % (ref 34.8–46.1)
HGB BLD-MCNC: 16 G/DL (ref 11.5–15.4)
HGB UR QL STRIP.AUTO: NEGATIVE
IMM GRANULOCYTES # BLD AUTO: 0.03 THOUSAND/UL (ref 0–0.2)
IMM GRANULOCYTES NFR BLD AUTO: 0 % (ref 0–2)
INR PPP: 1.05 (ref 0.84–1.19)
KETONES UR STRIP-MCNC: ABNORMAL MG/DL
LACTATE SERPL-SCNC: 1.5 MMOL/L (ref 0.5–2)
LACTATE SERPL-SCNC: 2.1 MMOL/L (ref 0.5–2)
LEUKOCYTE ESTERASE UR QL STRIP: NEGATIVE
LIPASE SERPL-CCNC: 53 U/L (ref 73–393)
LYMPHOCYTES # BLD AUTO: 1.32 THOUSANDS/ΜL (ref 0.6–4.47)
LYMPHOCYTES NFR BLD AUTO: 7 % (ref 14–44)
MCH RBC QN AUTO: 29.2 PG (ref 26.8–34.3)
MCHC RBC AUTO-ENTMCNC: 32.4 G/DL (ref 31.4–37.4)
MCV RBC AUTO: 90 FL (ref 82–98)
MONOCYTES # BLD AUTO: 1.43 THOUSAND/ΜL (ref 0.17–1.22)
MONOCYTES NFR BLD AUTO: 8 % (ref 4–12)
NEUTROPHILS # BLD AUTO: 16.21 THOUSANDS/ΜL (ref 1.85–7.62)
NEUTS SEG NFR BLD AUTO: 85 % (ref 43–75)
NITRITE UR QL STRIP: NEGATIVE
NON-SQ EPI CELLS URNS QL MICRO: NORMAL /HPF
NT-PROBNP SERPL-MCNC: 356 PG/ML
PH UR STRIP.AUTO: 5.5 [PH]
PLATELET # BLD AUTO: 267 THOUSANDS/UL (ref 149–390)
PLATELET # BLD AUTO: 369 THOUSANDS/UL (ref 149–390)
PMV BLD AUTO: 10.3 FL (ref 8.9–12.7)
PMV BLD AUTO: 10.4 FL (ref 8.9–12.7)
POTASSIUM SERPL-SCNC: 3.8 MMOL/L (ref 3.5–5.3)
PROT SERPL-MCNC: 7.5 G/DL (ref 6.4–8.2)
PROT UR STRIP-MCNC: ABNORMAL MG/DL
PROTHROMBIN TIME: 13.4 SECONDS (ref 11.6–14.5)
RBC # BLD AUTO: 5.48 MILLION/UL (ref 3.81–5.12)
RBC #/AREA URNS AUTO: NORMAL /HPF
SODIUM SERPL-SCNC: 144 MMOL/L (ref 136–145)
SP GR UR STRIP.AUTO: >=1.03 (ref 1–1.03)
TROPONIN I SERPL-MCNC: <0.02 NG/ML
UROBILINOGEN UR QL STRIP.AUTO: 0.2 E.U./DL
WBC # BLD AUTO: 19.02 THOUSAND/UL (ref 4.31–10.16)
WBC #/AREA URNS AUTO: NORMAL /HPF

## 2019-12-10 PROCEDURE — 84484 ASSAY OF TROPONIN QUANT: CPT | Performed by: EMERGENCY MEDICINE

## 2019-12-10 PROCEDURE — 87040 BLOOD CULTURE FOR BACTERIA: CPT | Performed by: EMERGENCY MEDICINE

## 2019-12-10 PROCEDURE — 96374 THER/PROPH/DIAG INJ IV PUSH: CPT

## 2019-12-10 PROCEDURE — 83605 ASSAY OF LACTIC ACID: CPT | Performed by: EMERGENCY MEDICINE

## 2019-12-10 PROCEDURE — 99285 EMERGENCY DEPT VISIT HI MDM: CPT | Performed by: EMERGENCY MEDICINE

## 2019-12-10 PROCEDURE — 80053 COMPREHEN METABOLIC PANEL: CPT | Performed by: EMERGENCY MEDICINE

## 2019-12-10 PROCEDURE — 36415 COLL VENOUS BLD VENIPUNCTURE: CPT | Performed by: EMERGENCY MEDICINE

## 2019-12-10 PROCEDURE — 85025 COMPLETE CBC W/AUTO DIFF WBC: CPT | Performed by: EMERGENCY MEDICINE

## 2019-12-10 PROCEDURE — 83605 ASSAY OF LACTIC ACID: CPT | Performed by: HOSPITALIST

## 2019-12-10 PROCEDURE — 71045 X-RAY EXAM CHEST 1 VIEW: CPT

## 2019-12-10 PROCEDURE — 85610 PROTHROMBIN TIME: CPT | Performed by: EMERGENCY MEDICINE

## 2019-12-10 PROCEDURE — 93005 ELECTROCARDIOGRAM TRACING: CPT

## 2019-12-10 PROCEDURE — 99223 1ST HOSP IP/OBS HIGH 75: CPT | Performed by: HOSPITALIST

## 2019-12-10 PROCEDURE — 99285 EMERGENCY DEPT VISIT HI MDM: CPT

## 2019-12-10 PROCEDURE — 85730 THROMBOPLASTIN TIME PARTIAL: CPT | Performed by: EMERGENCY MEDICINE

## 2019-12-10 PROCEDURE — 85049 AUTOMATED PLATELET COUNT: CPT | Performed by: HOSPITALIST

## 2019-12-10 PROCEDURE — 83880 ASSAY OF NATRIURETIC PEPTIDE: CPT | Performed by: EMERGENCY MEDICINE

## 2019-12-10 PROCEDURE — 83690 ASSAY OF LIPASE: CPT | Performed by: EMERGENCY MEDICINE

## 2019-12-10 PROCEDURE — 87081 CULTURE SCREEN ONLY: CPT | Performed by: EMERGENCY MEDICINE

## 2019-12-10 PROCEDURE — 81001 URINALYSIS AUTO W/SCOPE: CPT | Performed by: EMERGENCY MEDICINE

## 2019-12-10 RX ORDER — PANTOPRAZOLE SODIUM 40 MG/1
40 TABLET, DELAYED RELEASE ORAL DAILY
Status: DISCONTINUED | OUTPATIENT
Start: 2019-12-11 | End: 2019-12-13 | Stop reason: HOSPADM

## 2019-12-10 RX ORDER — SODIUM CHLORIDE 9 MG/ML
125 INJECTION, SOLUTION INTRAVENOUS CONTINUOUS
Status: DISCONTINUED | OUTPATIENT
Start: 2019-12-10 | End: 2019-12-11

## 2019-12-10 RX ORDER — ACETAMINOPHEN 325 MG/1
650 TABLET ORAL EVERY 6 HOURS PRN
Status: DISCONTINUED | OUTPATIENT
Start: 2019-12-10 | End: 2019-12-13 | Stop reason: HOSPADM

## 2019-12-10 RX ORDER — CITALOPRAM 10 MG/1
10 TABLET ORAL DAILY
Status: DISCONTINUED | OUTPATIENT
Start: 2019-12-11 | End: 2019-12-13 | Stop reason: HOSPADM

## 2019-12-10 RX ORDER — CEFTRIAXONE 2 G/50ML
2000 INJECTION, SOLUTION INTRAVENOUS ONCE
Status: COMPLETED | OUTPATIENT
Start: 2019-12-10 | End: 2019-12-10

## 2019-12-10 RX ORDER — DONEPEZIL HYDROCHLORIDE 5 MG/1
10 TABLET, FILM COATED ORAL
Status: DISCONTINUED | OUTPATIENT
Start: 2019-12-10 | End: 2019-12-13 | Stop reason: HOSPADM

## 2019-12-10 RX ORDER — ASPIRIN 81 MG/1
81 TABLET ORAL DAILY
Status: DISCONTINUED | OUTPATIENT
Start: 2019-12-11 | End: 2019-12-13 | Stop reason: HOSPADM

## 2019-12-10 RX ORDER — ONDANSETRON 2 MG/ML
4 INJECTION INTRAMUSCULAR; INTRAVENOUS EVERY 6 HOURS PRN
Status: DISCONTINUED | OUTPATIENT
Start: 2019-12-10 | End: 2019-12-13 | Stop reason: HOSPADM

## 2019-12-10 RX ORDER — GUAIFENESIN/DEXTROMETHORPHAN 100-10MG/5
10 SYRUP ORAL 3 TIMES DAILY PRN
Status: DISCONTINUED | OUTPATIENT
Start: 2019-12-10 | End: 2019-12-13 | Stop reason: HOSPADM

## 2019-12-10 RX ADMIN — SODIUM CHLORIDE 125 ML/HR: 0.9 INJECTION, SOLUTION INTRAVENOUS at 17:00

## 2019-12-10 RX ADMIN — DONEPEZIL HYDROCHLORIDE 10 MG: 5 TABLET ORAL at 21:56

## 2019-12-10 RX ADMIN — CEFTRIAXONE 2000 MG: 2 INJECTION, SOLUTION INTRAVENOUS at 17:24

## 2019-12-10 RX ADMIN — CARBIDOPA AND LEVODOPA 1 TABLET: 25; 100 TABLET ORAL at 20:33

## 2019-12-11 PROBLEM — E87.6 HYPOKALEMIA: Status: ACTIVE | Noted: 2019-12-11

## 2019-12-11 PROBLEM — E87.0 HYPERNATREMIA: Status: ACTIVE | Noted: 2019-12-11

## 2019-12-11 LAB
ANION GAP SERPL CALCULATED.3IONS-SCNC: 9 MMOL/L (ref 4–13)
ATRIAL RATE: 78 BPM
BASOPHILS # BLD AUTO: 0.04 THOUSANDS/ΜL (ref 0–0.1)
BASOPHILS NFR BLD AUTO: 0 % (ref 0–1)
BUN SERPL-MCNC: 14 MG/DL (ref 5–25)
CALCIUM SERPL-MCNC: 6.6 MG/DL (ref 8.3–10.1)
CHLORIDE SERPL-SCNC: 114 MMOL/L (ref 100–108)
CO2 SERPL-SCNC: 24 MMOL/L (ref 21–32)
CREAT SERPL-MCNC: 0.49 MG/DL (ref 0.6–1.3)
EOSINOPHIL # BLD AUTO: 0.12 THOUSAND/ΜL (ref 0–0.61)
EOSINOPHIL NFR BLD AUTO: 1 % (ref 0–6)
ERYTHROCYTE [DISTWIDTH] IN BLOOD BY AUTOMATED COUNT: 14.3 % (ref 11.6–15.1)
GFR SERPL CREATININE-BSD FRML MDRD: 84 ML/MIN/1.73SQ M
GLUCOSE SERPL-MCNC: 105 MG/DL (ref 65–140)
HCT VFR BLD AUTO: 46.7 % (ref 34.8–46.1)
HGB BLD-MCNC: 14.7 G/DL (ref 11.5–15.4)
IMM GRANULOCYTES # BLD AUTO: 0.03 THOUSAND/UL (ref 0–0.2)
IMM GRANULOCYTES NFR BLD AUTO: 0 % (ref 0–2)
LYMPHOCYTES # BLD AUTO: 1.37 THOUSANDS/ΜL (ref 0.6–4.47)
LYMPHOCYTES NFR BLD AUTO: 12 % (ref 14–44)
MAGNESIUM SERPL-MCNC: 1.7 MG/DL (ref 1.6–2.6)
MCH RBC QN AUTO: 28.9 PG (ref 26.8–34.3)
MCHC RBC AUTO-ENTMCNC: 31.5 G/DL (ref 31.4–37.4)
MCV RBC AUTO: 92 FL (ref 82–98)
MONOCYTES # BLD AUTO: 0.92 THOUSAND/ΜL (ref 0.17–1.22)
MONOCYTES NFR BLD AUTO: 8 % (ref 4–12)
NEUTROPHILS # BLD AUTO: 8.8 THOUSANDS/ΜL (ref 1.85–7.62)
NEUTS SEG NFR BLD AUTO: 79 % (ref 43–75)
P AXIS: 24 DEGREES
PLATELET # BLD AUTO: 305 THOUSANDS/UL (ref 149–390)
PMV BLD AUTO: 10.1 FL (ref 8.9–12.7)
POTASSIUM SERPL-SCNC: 2.5 MMOL/L (ref 3.5–5.3)
POTASSIUM SERPL-SCNC: 4.2 MMOL/L (ref 3.5–5.3)
PR INTERVAL: 170 MS
QRS AXIS: 56 DEGREES
QRSD INTERVAL: 76 MS
QT INTERVAL: 390 MS
QTC INTERVAL: 444 MS
RBC # BLD AUTO: 5.08 MILLION/UL (ref 3.81–5.12)
SODIUM SERPL-SCNC: 147 MMOL/L (ref 136–145)
T WAVE AXIS: 48 DEGREES
VENTRICULAR RATE: 78 BPM
WBC # BLD AUTO: 11.28 THOUSAND/UL (ref 4.31–10.16)

## 2019-12-11 PROCEDURE — 97163 PT EVAL HIGH COMPLEX 45 MIN: CPT

## 2019-12-11 PROCEDURE — G8988 SELF CARE GOAL STATUS: HCPCS

## 2019-12-11 PROCEDURE — 85025 COMPLETE CBC W/AUTO DIFF WBC: CPT | Performed by: HOSPITALIST

## 2019-12-11 PROCEDURE — 80048 BASIC METABOLIC PNL TOTAL CA: CPT | Performed by: HOSPITALIST

## 2019-12-11 PROCEDURE — G8978 MOBILITY CURRENT STATUS: HCPCS

## 2019-12-11 PROCEDURE — G8979 MOBILITY GOAL STATUS: HCPCS

## 2019-12-11 PROCEDURE — 93010 ELECTROCARDIOGRAM REPORT: CPT | Performed by: INTERNAL MEDICINE

## 2019-12-11 PROCEDURE — 83735 ASSAY OF MAGNESIUM: CPT | Performed by: HOSPITALIST

## 2019-12-11 PROCEDURE — 99233 SBSQ HOSP IP/OBS HIGH 50: CPT | Performed by: HOSPITALIST

## 2019-12-11 PROCEDURE — 84132 ASSAY OF SERUM POTASSIUM: CPT | Performed by: INTERNAL MEDICINE

## 2019-12-11 PROCEDURE — G8987 SELF CARE CURRENT STATUS: HCPCS

## 2019-12-11 PROCEDURE — 97167 OT EVAL HIGH COMPLEX 60 MIN: CPT

## 2019-12-11 RX ORDER — DEXTROSE AND SODIUM CHLORIDE 5; .45 G/100ML; G/100ML
125 INJECTION, SOLUTION INTRAVENOUS CONTINUOUS
Status: DISCONTINUED | OUTPATIENT
Start: 2019-12-11 | End: 2019-12-13

## 2019-12-11 RX ORDER — POTASSIUM CHLORIDE 14.9 MG/ML
20 INJECTION INTRAVENOUS
Status: COMPLETED | OUTPATIENT
Start: 2019-12-11 | End: 2019-12-11

## 2019-12-11 RX ADMIN — POTASSIUM CHLORIDE 20 MEQ: 200 INJECTION, SOLUTION INTRAVENOUS at 08:53

## 2019-12-11 RX ADMIN — CARBIDOPA AND LEVODOPA 1 TABLET: 25; 100 TABLET ORAL at 18:16

## 2019-12-11 RX ADMIN — PANTOPRAZOLE SODIUM 40 MG: 40 TABLET, DELAYED RELEASE ORAL at 05:54

## 2019-12-11 RX ADMIN — CITALOPRAM HYDROBROMIDE 10 MG: 10 TABLET ORAL at 09:00

## 2019-12-11 RX ADMIN — DEXTROSE AND SODIUM CHLORIDE 125 ML/HR: 5; .45 INJECTION, SOLUTION INTRAVENOUS at 20:53

## 2019-12-11 RX ADMIN — POTASSIUM CHLORIDE 20 MEQ: 200 INJECTION, SOLUTION INTRAVENOUS at 12:21

## 2019-12-11 RX ADMIN — DEXTROSE AND SODIUM CHLORIDE 125 ML/HR: 5; .45 INJECTION, SOLUTION INTRAVENOUS at 08:59

## 2019-12-11 RX ADMIN — CARBIDOPA AND LEVODOPA 1 TABLET: 25; 100 TABLET ORAL at 12:38

## 2019-12-11 RX ADMIN — CARBIDOPA AND LEVODOPA 1 TABLET: 25; 100 TABLET ORAL at 21:33

## 2019-12-11 RX ADMIN — CARBIDOPA AND LEVODOPA 1 TABLET: 25; 100 TABLET ORAL at 09:00

## 2019-12-11 RX ADMIN — ASPIRIN 81 MG: 81 TABLET, COATED ORAL at 09:00

## 2019-12-11 RX ADMIN — DONEPEZIL HYDROCHLORIDE 10 MG: 5 TABLET ORAL at 21:33

## 2019-12-11 RX ADMIN — ENOXAPARIN SODIUM 40 MG: 40 INJECTION SUBCUTANEOUS at 09:00

## 2019-12-12 LAB
ANION GAP SERPL CALCULATED.3IONS-SCNC: 11 MMOL/L (ref 4–13)
BASOPHILS # BLD AUTO: 0.05 THOUSANDS/ΜL (ref 0–0.1)
BASOPHILS NFR BLD AUTO: 1 % (ref 0–1)
BUN SERPL-MCNC: 6 MG/DL (ref 5–25)
CALCIUM SERPL-MCNC: 8.6 MG/DL (ref 8.3–10.1)
CHLORIDE SERPL-SCNC: 107 MMOL/L (ref 100–108)
CO2 SERPL-SCNC: 23 MMOL/L (ref 21–32)
CREAT SERPL-MCNC: 0.5 MG/DL (ref 0.6–1.3)
EOSINOPHIL # BLD AUTO: 0.16 THOUSAND/ΜL (ref 0–0.61)
EOSINOPHIL NFR BLD AUTO: 2 % (ref 0–6)
ERYTHROCYTE [DISTWIDTH] IN BLOOD BY AUTOMATED COUNT: 14.2 % (ref 11.6–15.1)
GFR SERPL CREATININE-BSD FRML MDRD: 84 ML/MIN/1.73SQ M
GLUCOSE SERPL-MCNC: 161 MG/DL (ref 65–140)
HCT VFR BLD AUTO: 43.9 % (ref 34.8–46.1)
HGB BLD-MCNC: 13.7 G/DL (ref 11.5–15.4)
IMM GRANULOCYTES # BLD AUTO: 0.02 THOUSAND/UL (ref 0–0.2)
IMM GRANULOCYTES NFR BLD AUTO: 0 % (ref 0–2)
LYMPHOCYTES # BLD AUTO: 1.58 THOUSANDS/ΜL (ref 0.6–4.47)
LYMPHOCYTES NFR BLD AUTO: 15 % (ref 14–44)
MCH RBC QN AUTO: 29 PG (ref 26.8–34.3)
MCHC RBC AUTO-ENTMCNC: 31.2 G/DL (ref 31.4–37.4)
MCV RBC AUTO: 93 FL (ref 82–98)
MONOCYTES # BLD AUTO: 1.11 THOUSAND/ΜL (ref 0.17–1.22)
MONOCYTES NFR BLD AUTO: 11 % (ref 4–12)
MRSA NOSE QL CULT: NORMAL
NEUTROPHILS # BLD AUTO: 7.36 THOUSANDS/ΜL (ref 1.85–7.62)
NEUTS SEG NFR BLD AUTO: 71 % (ref 43–75)
PLATELET # BLD AUTO: 260 THOUSANDS/UL (ref 149–390)
PMV BLD AUTO: 10.8 FL (ref 8.9–12.7)
POTASSIUM SERPL-SCNC: 3.4 MMOL/L (ref 3.5–5.3)
RBC # BLD AUTO: 4.72 MILLION/UL (ref 3.81–5.12)
SODIUM SERPL-SCNC: 141 MMOL/L (ref 136–145)
WBC # BLD AUTO: 10.28 THOUSAND/UL (ref 4.31–10.16)

## 2019-12-12 PROCEDURE — 97116 GAIT TRAINING THERAPY: CPT

## 2019-12-12 PROCEDURE — 97530 THERAPEUTIC ACTIVITIES: CPT

## 2019-12-12 PROCEDURE — 99232 SBSQ HOSP IP/OBS MODERATE 35: CPT | Performed by: HOSPITALIST

## 2019-12-12 PROCEDURE — 85025 COMPLETE CBC W/AUTO DIFF WBC: CPT | Performed by: HOSPITALIST

## 2019-12-12 PROCEDURE — G8996 SWALLOW CURRENT STATUS: HCPCS

## 2019-12-12 PROCEDURE — 92610 EVALUATE SWALLOWING FUNCTION: CPT

## 2019-12-12 PROCEDURE — 97535 SELF CARE MNGMENT TRAINING: CPT

## 2019-12-12 PROCEDURE — 80048 BASIC METABOLIC PNL TOTAL CA: CPT | Performed by: HOSPITALIST

## 2019-12-12 PROCEDURE — G8997 SWALLOW GOAL STATUS: HCPCS

## 2019-12-12 RX ORDER — POTASSIUM CHLORIDE 20 MEQ/1
40 TABLET, EXTENDED RELEASE ORAL ONCE
Status: COMPLETED | OUTPATIENT
Start: 2019-12-12 | End: 2019-12-12

## 2019-12-12 RX ADMIN — CARBIDOPA AND LEVODOPA 1 TABLET: 25; 100 TABLET ORAL at 11:52

## 2019-12-12 RX ADMIN — CARBIDOPA AND LEVODOPA 1 TABLET: 25; 100 TABLET ORAL at 08:47

## 2019-12-12 RX ADMIN — DEXTROSE AND SODIUM CHLORIDE 125 ML/HR: 5; .45 INJECTION, SOLUTION INTRAVENOUS at 21:33

## 2019-12-12 RX ADMIN — ENOXAPARIN SODIUM 40 MG: 40 INJECTION SUBCUTANEOUS at 08:48

## 2019-12-12 RX ADMIN — CARBIDOPA AND LEVODOPA 1 TABLET: 25; 100 TABLET ORAL at 18:05

## 2019-12-12 RX ADMIN — PANTOPRAZOLE SODIUM 40 MG: 40 TABLET, DELAYED RELEASE ORAL at 05:36

## 2019-12-12 RX ADMIN — DONEPEZIL HYDROCHLORIDE 10 MG: 5 TABLET ORAL at 21:21

## 2019-12-12 RX ADMIN — CITALOPRAM HYDROBROMIDE 10 MG: 10 TABLET ORAL at 08:48

## 2019-12-12 RX ADMIN — POTASSIUM CHLORIDE 40 MEQ: 1500 TABLET, EXTENDED RELEASE ORAL at 11:52

## 2019-12-12 RX ADMIN — CARBIDOPA AND LEVODOPA 1 TABLET: 25; 100 TABLET ORAL at 21:21

## 2019-12-12 RX ADMIN — ASPIRIN 81 MG: 81 TABLET, COATED ORAL at 08:48

## 2019-12-13 VITALS
DIASTOLIC BLOOD PRESSURE: 73 MMHG | SYSTOLIC BLOOD PRESSURE: 161 MMHG | HEIGHT: 56 IN | BODY MASS INDEX: 24.15 KG/M2 | WEIGHT: 107.36 LBS | RESPIRATION RATE: 17 BRPM | OXYGEN SATURATION: 99 % | HEART RATE: 72 BPM | TEMPERATURE: 99 F

## 2019-12-13 LAB
ANION GAP SERPL CALCULATED.3IONS-SCNC: 12 MMOL/L (ref 4–13)
BASOPHILS # BLD AUTO: 0.03 THOUSANDS/ΜL (ref 0–0.1)
BASOPHILS NFR BLD AUTO: 0 % (ref 0–1)
BUN SERPL-MCNC: 2 MG/DL (ref 5–25)
CALCIUM SERPL-MCNC: 8.8 MG/DL (ref 8.3–10.1)
CHLORIDE SERPL-SCNC: 107 MMOL/L (ref 100–108)
CO2 SERPL-SCNC: 24 MMOL/L (ref 21–32)
CREAT SERPL-MCNC: 0.61 MG/DL (ref 0.6–1.3)
EOSINOPHIL # BLD AUTO: 0.18 THOUSAND/ΜL (ref 0–0.61)
EOSINOPHIL NFR BLD AUTO: 2 % (ref 0–6)
ERYTHROCYTE [DISTWIDTH] IN BLOOD BY AUTOMATED COUNT: 13.7 % (ref 11.6–15.1)
GFR SERPL CREATININE-BSD FRML MDRD: 78 ML/MIN/1.73SQ M
GLUCOSE SERPL-MCNC: 140 MG/DL (ref 65–140)
HCT VFR BLD AUTO: 45.6 % (ref 34.8–46.1)
HGB BLD-MCNC: 14.8 G/DL (ref 11.5–15.4)
IMM GRANULOCYTES # BLD AUTO: 0.02 THOUSAND/UL (ref 0–0.2)
IMM GRANULOCYTES NFR BLD AUTO: 0 % (ref 0–2)
LYMPHOCYTES # BLD AUTO: 1.41 THOUSANDS/ΜL (ref 0.6–4.47)
LYMPHOCYTES NFR BLD AUTO: 17 % (ref 14–44)
MCH RBC QN AUTO: 29.2 PG (ref 26.8–34.3)
MCHC RBC AUTO-ENTMCNC: 32.5 G/DL (ref 31.4–37.4)
MCV RBC AUTO: 90 FL (ref 82–98)
MONOCYTES # BLD AUTO: 0.92 THOUSAND/ΜL (ref 0.17–1.22)
MONOCYTES NFR BLD AUTO: 11 % (ref 4–12)
NEUTROPHILS # BLD AUTO: 5.61 THOUSANDS/ΜL (ref 1.85–7.62)
NEUTS SEG NFR BLD AUTO: 70 % (ref 43–75)
PLATELET # BLD AUTO: 288 THOUSANDS/UL (ref 149–390)
PMV BLD AUTO: 10.4 FL (ref 8.9–12.7)
POTASSIUM SERPL-SCNC: 3.5 MMOL/L (ref 3.5–5.3)
RBC # BLD AUTO: 5.07 MILLION/UL (ref 3.81–5.12)
SODIUM SERPL-SCNC: 143 MMOL/L (ref 136–145)
WBC # BLD AUTO: 8.17 THOUSAND/UL (ref 4.31–10.16)

## 2019-12-13 PROCEDURE — 80048 BASIC METABOLIC PNL TOTAL CA: CPT | Performed by: HOSPITALIST

## 2019-12-13 PROCEDURE — 85025 COMPLETE CBC W/AUTO DIFF WBC: CPT | Performed by: HOSPITALIST

## 2019-12-13 PROCEDURE — 99239 HOSP IP/OBS DSCHRG MGMT >30: CPT | Performed by: HOSPITALIST

## 2019-12-13 RX ADMIN — PANTOPRAZOLE SODIUM 40 MG: 40 TABLET, DELAYED RELEASE ORAL at 05:42

## 2019-12-13 RX ADMIN — ASPIRIN 81 MG: 81 TABLET, COATED ORAL at 09:32

## 2019-12-13 RX ADMIN — CARBIDOPA AND LEVODOPA 1 TABLET: 25; 100 TABLET ORAL at 09:32

## 2019-12-13 RX ADMIN — CITALOPRAM HYDROBROMIDE 10 MG: 10 TABLET ORAL at 09:32

## 2019-12-13 RX ADMIN — ENOXAPARIN SODIUM 40 MG: 40 INJECTION SUBCUTANEOUS at 09:32

## 2019-12-15 LAB
BACTERIA BLD CULT: NORMAL
BACTERIA BLD CULT: NORMAL